# Patient Record
Sex: FEMALE | Race: WHITE | NOT HISPANIC OR LATINO | Employment: OTHER | URBAN - METROPOLITAN AREA
[De-identification: names, ages, dates, MRNs, and addresses within clinical notes are randomized per-mention and may not be internally consistent; named-entity substitution may affect disease eponyms.]

---

## 2016-10-26 LAB — CRC RECOMMENDATION EXT: NORMAL

## 2018-06-26 ENCOUNTER — HISTORICAL (OUTPATIENT)
Dept: ADMINISTRATIVE | Facility: HOSPITAL | Age: 63
End: 2018-06-26

## 2018-06-26 LAB
APPEARANCE, UA: CLEAR
BACTERIA #/AREA URNS AUTO: ABNORMAL /HPF
BILIRUB UR QL STRIP: NEGATIVE MG/DL
COLOR UR: YELLOW
GLUCOSE (UA): NEGATIVE MG/DL
HGB UR QL STRIP: ABNORMAL UNIT/L
KETONES UR QL STRIP: NEGATIVE MG/DL
LEUKOCYTE ESTERASE UR QL STRIP: ABNORMAL UNIT/L
NITRITE UR QL STRIP.AUTO: POSITIVE
PH UR STRIP: 7 [PH]
PROT UR QL STRIP: NEGATIVE MG/DL
RBC #/AREA URNS HPF: ABNORMAL /HPF
SP GR UR STRIP: 1.02
SQUAMOUS EPITHELIAL, UA: ABNORMAL /LPF
UROBILINOGEN UR STRIP-ACNC: 0.2 MG/DL
WBC #/AREA URNS AUTO: ABNORMAL /[HPF]

## 2018-10-16 ENCOUNTER — HISTORICAL (OUTPATIENT)
Dept: ADMINISTRATIVE | Facility: HOSPITAL | Age: 63
End: 2018-10-16

## 2019-01-30 ENCOUNTER — HISTORICAL (OUTPATIENT)
Dept: ADMINISTRATIVE | Facility: HOSPITAL | Age: 64
End: 2019-01-30

## 2019-01-30 LAB
ABS NEUT (OLG): 1.3 X10(3)/MCL (ref 2.1–9.2)
ALBUMIN SERPL-MCNC: 3.7 GM/DL (ref 3.4–5)
ALBUMIN/GLOB SERPL: 1.1 RATIO (ref 1.1–2)
ALP SERPL-CCNC: 62 UNIT/L (ref 38–126)
ALT SERPL-CCNC: 52 UNIT/L (ref 12–78)
ANISOCYTOSIS BLD QL SMEAR: 0
AST SERPL-CCNC: 36 UNIT/L (ref 15–37)
BILIRUB SERPL-MCNC: 0.5 MG/DL (ref 0.2–1)
BILIRUBIN DIRECT+TOT PNL SERPL-MCNC: 0.1 MG/DL (ref 0–0.5)
BILIRUBIN DIRECT+TOT PNL SERPL-MCNC: 0.4 MG/DL (ref 0–0.8)
BUN SERPL-MCNC: 19 MG/DL (ref 7–18)
CALCIUM SERPL-MCNC: 9.4 MG/DL (ref 8.5–10.1)
CHLORIDE SERPL-SCNC: 109 MMOL/L (ref 98–107)
CHOLEST SERPL-MCNC: 163 MG/DL (ref 0–200)
CHOLEST/HDLC SERPL: 2.6 {RATIO} (ref 0–4)
CO2 SERPL-SCNC: 27 MMOL/L (ref 21–32)
CREAT SERPL-MCNC: 1.21 MG/DL (ref 0.55–1.02)
EOSINOPHIL NFR BLD MANUAL: 3 % (ref 0–8)
ERYTHROCYTE [DISTWIDTH] IN BLOOD BY AUTOMATED COUNT: 11.9 % (ref 11.5–17)
GLOBULIN SER-MCNC: 3.3 GM/DL (ref 2.4–3.5)
GLUCOSE SERPL-MCNC: 91 MG/DL (ref 74–106)
HCT VFR BLD AUTO: 41.9 % (ref 37–47)
HDLC SERPL-MCNC: 63 MG/DL (ref 35–60)
HGB BLD-MCNC: 13.6 GM/DL (ref 12–16)
HYPOCHROMIA BLD QL SMEAR: 0
LDLC SERPL CALC-MCNC: 86 MG/DL (ref 0–129)
LYMPHOCYTES NFR BLD MANUAL: 37 % (ref 13–40)
MACROCYTES BLD QL SMEAR: 0
MCH RBC QN AUTO: 31.3 PG (ref 27–31)
MCHC RBC AUTO-ENTMCNC: 32.5 GM/DL (ref 33–36)
MCV RBC AUTO: 96.3 FL (ref 80–94)
MICROCYTES BLD QL SMEAR: 0
MONOCYTES NFR BLD MANUAL: 19 % (ref 2–11)
NEUTROPHILS NFR BLD MANUAL: 41 % (ref 47–80)
PLATELET # BLD AUTO: 185 X10(3)/MCL (ref 130–400)
PLATELET # BLD EST: NORMAL 10*3/UL
PMV BLD AUTO: 10.2 FL (ref 7.4–10.4)
POIKILOCYTOSIS BLD QL SMEAR: 0
POLYCHROMASIA BLD QL SMEAR: 0
POTASSIUM SERPL-SCNC: 4.5 MMOL/L (ref 3.5–5.1)
PROT SERPL-MCNC: 7 GM/DL (ref 6.4–8.2)
RBC # BLD AUTO: 4.35 X10(6)/MCL (ref 4.2–5.4)
SODIUM SERPL-SCNC: 144 MMOL/L (ref 136–145)
TRIGL SERPL-MCNC: 69 MG/DL (ref 30–150)
VLDLC SERPL CALC-MCNC: 14 MG/DL
WBC # SPEC AUTO: 2.9 X10(3)/MCL (ref 4.5–11.5)

## 2019-04-25 ENCOUNTER — HISTORICAL (OUTPATIENT)
Dept: ADMINISTRATIVE | Facility: HOSPITAL | Age: 64
End: 2019-04-25

## 2019-04-25 LAB
ABS NEUT (OLG): 2.68 X10(3)/MCL (ref 2.1–9.2)
APPEARANCE, UA: CLEAR
BACTERIA SPEC CULT: NORMAL /HPF
BASOPHILS # BLD AUTO: 0 X10(3)/MCL (ref 0–0.2)
BASOPHILS NFR BLD AUTO: 0 %
BILIRUB UR QL STRIP: NEGATIVE
BUN SERPL-MCNC: 21 MG/DL (ref 7–18)
CALCIUM SERPL-MCNC: 8.8 MG/DL (ref 8.5–10.1)
CHLORIDE SERPL-SCNC: 109 MMOL/L (ref 98–107)
CO2 SERPL-SCNC: 29 MMOL/L (ref 21–32)
COLOR UR: YELLOW
CREAT SERPL-MCNC: 1.04 MG/DL (ref 0.55–1.02)
CREAT/UREA NIT SERPL: 20.2
EOSINOPHIL # BLD AUTO: 0.1 X10(3)/MCL (ref 0–0.9)
EOSINOPHIL NFR BLD AUTO: 2 %
ERYTHROCYTE [DISTWIDTH] IN BLOOD BY AUTOMATED COUNT: 12.4 % (ref 11.5–17)
GLUCOSE (UA): NEGATIVE
GLUCOSE SERPL-MCNC: 91 MG/DL (ref 74–106)
HCT VFR BLD AUTO: 41.2 % (ref 37–47)
HGB BLD-MCNC: 13.3 GM/DL (ref 12–16)
HGB UR QL STRIP: NEGATIVE
KETONES UR QL STRIP: NEGATIVE
LEUKOCYTE ESTERASE UR QL STRIP: NEGATIVE
LYMPHOCYTES # BLD AUTO: 1.5 X10(3)/MCL (ref 0.6–4.6)
LYMPHOCYTES NFR BLD AUTO: 33 %
MCH RBC QN AUTO: 31.7 PG (ref 27–31)
MCHC RBC AUTO-ENTMCNC: 32.3 GM/DL (ref 33–36)
MCV RBC AUTO: 98.3 FL (ref 80–94)
MONOCYTES # BLD AUTO: 0.3 X10(3)/MCL (ref 0.1–1.3)
MONOCYTES NFR BLD AUTO: 6 %
NEUTROPHILS # BLD AUTO: 2.68 X10(3)/MCL (ref 2.1–9.2)
NEUTROPHILS NFR BLD AUTO: 58 %
NITRITE UR QL STRIP: NEGATIVE
PH UR STRIP: 5 [PH] (ref 5–9)
PLATELET # BLD AUTO: 199 X10(3)/MCL (ref 130–400)
PMV BLD AUTO: 10.3 FL (ref 9.4–12.4)
POTASSIUM SERPL-SCNC: 4.5 MMOL/L (ref 3.5–5.1)
PROT UR QL STRIP: NEGATIVE
RBC # BLD AUTO: 4.19 X10(6)/MCL (ref 4.2–5.4)
RBC #/AREA URNS HPF: NORMAL /[HPF]
SODIUM SERPL-SCNC: 142 MMOL/L (ref 136–145)
SP GR UR STRIP: 1.02 (ref 1–1.03)
SQUAMOUS EPITHELIAL, UA: NORMAL
UROBILINOGEN UR STRIP-ACNC: 0.2
WBC # SPEC AUTO: 4.6 X10(3)/MCL (ref 4.5–11.5)
WBC #/AREA URNS HPF: NORMAL /[HPF]

## 2019-06-27 LAB
BILIRUB SERPL-MCNC: NEGATIVE MG/DL
BLOOD URINE, POC: NEGATIVE
CLARITY, POC UA: CLEAR
COLOR, POC UA: YELLOW
GLUCOSE UR QL STRIP: NEGATIVE
KETONES UR QL STRIP: NEGATIVE
LEUKOCYTE EST, POC UA: NEGATIVE
NITRITE, POC UA: NEGATIVE
PH, POC UA: 5
PROTEIN, POC: NEGATIVE
SPECIFIC GRAVITY, POC UA: 1
UROBILINOGEN, POC UA: NORMAL

## 2020-01-22 ENCOUNTER — HISTORICAL (OUTPATIENT)
Dept: LAB | Facility: HOSPITAL | Age: 65
End: 2020-01-22

## 2020-01-22 LAB
ABS NEUT (OLG): 2.91 X10(3)/MCL (ref 2.1–9.2)
ALBUMIN SERPL-MCNC: 4.1 GM/DL (ref 3.4–5)
ALBUMIN/GLOB SERPL: 1.2 RATIO (ref 1.1–2)
ALP SERPL-CCNC: 62 UNIT/L (ref 38–126)
ALT SERPL-CCNC: 46 UNIT/L (ref 12–78)
APPEARANCE, UA: CLEAR
AST SERPL-CCNC: 22 UNIT/L (ref 15–37)
BACTERIA SPEC CULT: ABNORMAL /HPF
BASOPHILS # BLD AUTO: 0 X10(3)/MCL (ref 0–0.2)
BASOPHILS NFR BLD AUTO: 0 %
BILIRUB SERPL-MCNC: 0.6 MG/DL (ref 0.2–1)
BILIRUB UR QL STRIP: NEGATIVE
BILIRUBIN DIRECT+TOT PNL SERPL-MCNC: 0.1 MG/DL (ref 0–0.5)
BILIRUBIN DIRECT+TOT PNL SERPL-MCNC: 0.5 MG/DL (ref 0–0.8)
BUN SERPL-MCNC: 20 MG/DL (ref 7–18)
CALCIUM SERPL-MCNC: 9.6 MG/DL (ref 8.5–10.1)
CHLORIDE SERPL-SCNC: 111 MMOL/L (ref 98–107)
CHOLEST SERPL-MCNC: 185 MG/DL (ref 0–200)
CHOLEST/HDLC SERPL: 2.9 {RATIO} (ref 0–4)
CO2 SERPL-SCNC: 28 MMOL/L (ref 21–32)
COLOR UR: YELLOW
CREAT SERPL-MCNC: 1.09 MG/DL (ref 0.55–1.02)
EOSINOPHIL # BLD AUTO: 0.1 X10(3)/MCL (ref 0–0.9)
EOSINOPHIL NFR BLD AUTO: 3 %
ERYTHROCYTE [DISTWIDTH] IN BLOOD BY AUTOMATED COUNT: 12.2 % (ref 11.5–17)
GLOBULIN SER-MCNC: 3.3 GM/DL (ref 2.4–3.5)
GLUCOSE (UA): NEGATIVE
GLUCOSE SERPL-MCNC: 96 MG/DL (ref 74–106)
HCT VFR BLD AUTO: 43.4 % (ref 37–47)
HDLC SERPL-MCNC: 63 MG/DL (ref 35–60)
HGB BLD-MCNC: 14.2 GM/DL (ref 12–16)
HGB UR QL STRIP: NEGATIVE
KETONES UR QL STRIP: NEGATIVE
LDLC SERPL CALC-MCNC: 104 MG/DL (ref 0–129)
LEUKOCYTE ESTERASE UR QL STRIP: ABNORMAL
LYMPHOCYTES # BLD AUTO: 1.5 X10(3)/MCL (ref 0.6–4.6)
LYMPHOCYTES NFR BLD AUTO: 31 %
MCH RBC QN AUTO: 31.6 PG (ref 27–31)
MCHC RBC AUTO-ENTMCNC: 32.7 GM/DL (ref 33–36)
MCV RBC AUTO: 96.4 FL (ref 80–94)
MONOCYTES # BLD AUTO: 0.3 X10(3)/MCL (ref 0.1–1.3)
MONOCYTES NFR BLD AUTO: 6 %
NEUTROPHILS # BLD AUTO: 2.91 X10(3)/MCL (ref 2.1–9.2)
NEUTROPHILS NFR BLD AUTO: 60 %
NITRITE UR QL STRIP: NEGATIVE
PH UR STRIP: 5.5 [PH] (ref 5–9)
PLATELET # BLD AUTO: 255 X10(3)/MCL (ref 130–400)
PMV BLD AUTO: 10.8 FL (ref 9.4–12.4)
POTASSIUM SERPL-SCNC: 4.6 MMOL/L (ref 3.5–5.1)
PROT SERPL-MCNC: 7.4 GM/DL (ref 6.4–8.2)
PROT UR QL STRIP: NEGATIVE
RBC # BLD AUTO: 4.5 X10(6)/MCL (ref 4.2–5.4)
RBC #/AREA URNS HPF: ABNORMAL /[HPF]
SODIUM SERPL-SCNC: 145 MMOL/L (ref 136–145)
SP GR UR STRIP: 1.02 (ref 1–1.03)
SQUAMOUS EPITHELIAL, UA: ABNORMAL
TRIGL SERPL-MCNC: 88 MG/DL (ref 30–150)
UROBILINOGEN UR STRIP-ACNC: 0.2
VLDLC SERPL CALC-MCNC: 18 MG/DL
WBC # SPEC AUTO: 4.9 X10(3)/MCL (ref 4.5–11.5)
WBC #/AREA URNS HPF: ABNORMAL /[HPF]

## 2021-01-26 ENCOUNTER — HISTORICAL (OUTPATIENT)
Dept: ADMINISTRATIVE | Facility: HOSPITAL | Age: 66
End: 2021-01-26

## 2021-01-26 LAB
ABS NEUT (OLG): 3.47 X10(3)/MCL (ref 2.1–9.2)
ALBUMIN SERPL-MCNC: 4.2 GM/DL (ref 3.4–4.8)
ALBUMIN/GLOB SERPL: 1.7 RATIO (ref 1.1–2)
ALP SERPL-CCNC: 61 UNIT/L (ref 40–150)
ALT SERPL-CCNC: 29 UNIT/L (ref 0–55)
AST SERPL-CCNC: 22 UNIT/L (ref 5–34)
BASOPHILS # BLD AUTO: 0 X10(3)/MCL (ref 0–0.2)
BASOPHILS NFR BLD AUTO: 0 %
BILIRUB SERPL-MCNC: 0.6 MG/DL
BILIRUBIN DIRECT+TOT PNL SERPL-MCNC: 0.2 MG/DL (ref 0–0.5)
BILIRUBIN DIRECT+TOT PNL SERPL-MCNC: 0.4 MG/DL (ref 0–0.8)
BUN SERPL-MCNC: 18.9 MG/DL (ref 9.8–20.1)
CALCIUM SERPL-MCNC: 9.3 MG/DL (ref 8.4–10.2)
CHLORIDE SERPL-SCNC: 107 MMOL/L (ref 98–107)
CHOLEST SERPL-MCNC: 191 MG/DL
CHOLEST/HDLC SERPL: 4 {RATIO} (ref 0–5)
CO2 SERPL-SCNC: 25 MMOL/L (ref 23–31)
CREAT SERPL-MCNC: 0.99 MG/DL (ref 0.55–1.02)
EOSINOPHIL # BLD AUTO: 0.1 X10(3)/MCL (ref 0–0.9)
EOSINOPHIL NFR BLD AUTO: 2 %
ERYTHROCYTE [DISTWIDTH] IN BLOOD BY AUTOMATED COUNT: 12.4 % (ref 11.5–17)
GLOBULIN SER-MCNC: 2.5 GM/DL (ref 2.4–3.5)
GLUCOSE SERPL-MCNC: 87 MG/DL (ref 82–115)
HCT VFR BLD AUTO: 41.8 % (ref 37–47)
HDLC SERPL-MCNC: 47 MG/DL (ref 35–60)
HGB BLD-MCNC: 13.8 GM/DL (ref 12–16)
LDLC SERPL CALC-MCNC: 119 MG/DL (ref 50–140)
LYMPHOCYTES # BLD AUTO: 1.9 X10(3)/MCL (ref 0.6–4.6)
LYMPHOCYTES NFR BLD AUTO: 32 %
MCH RBC QN AUTO: 31.9 PG (ref 27–31)
MCHC RBC AUTO-ENTMCNC: 33 GM/DL (ref 33–36)
MCV RBC AUTO: 96.5 FL (ref 80–94)
MONOCYTES # BLD AUTO: 0.4 X10(3)/MCL (ref 0.1–1.3)
MONOCYTES NFR BLD AUTO: 6 %
NEUTROPHILS # BLD AUTO: 3.47 X10(3)/MCL (ref 2.1–9.2)
NEUTROPHILS NFR BLD AUTO: 59 %
PLATELET # BLD AUTO: 246 X10(3)/MCL (ref 130–400)
PMV BLD AUTO: 11.1 FL (ref 9.4–12.4)
POTASSIUM SERPL-SCNC: 4.9 MMOL/L (ref 3.5–5.1)
PROT SERPL-MCNC: 6.7 GM/DL (ref 5.8–7.6)
RBC # BLD AUTO: 4.33 X10(6)/MCL (ref 4.2–5.4)
SODIUM SERPL-SCNC: 144 MMOL/L (ref 136–145)
TRIGL SERPL-MCNC: 127 MG/DL (ref 37–140)
VLDLC SERPL CALC-MCNC: 25 MG/DL
WBC # SPEC AUTO: 5.9 X10(3)/MCL (ref 4.5–11.5)

## 2021-03-21 ENCOUNTER — HISTORICAL (OUTPATIENT)
Dept: ADMINISTRATIVE | Facility: HOSPITAL | Age: 66
End: 2021-03-21

## 2021-03-31 ENCOUNTER — HISTORICAL (OUTPATIENT)
Dept: ADMINISTRATIVE | Facility: HOSPITAL | Age: 66
End: 2021-03-31

## 2021-04-14 ENCOUNTER — HISTORICAL (OUTPATIENT)
Dept: ADMINISTRATIVE | Facility: HOSPITAL | Age: 66
End: 2021-04-14

## 2021-05-05 ENCOUNTER — HISTORICAL (OUTPATIENT)
Dept: ADMINISTRATIVE | Facility: HOSPITAL | Age: 66
End: 2021-05-05

## 2021-05-28 ENCOUNTER — HISTORICAL (OUTPATIENT)
Dept: ADMINISTRATIVE | Facility: HOSPITAL | Age: 66
End: 2021-05-28

## 2021-06-16 ENCOUNTER — HISTORICAL (OUTPATIENT)
Dept: ADMINISTRATIVE | Facility: HOSPITAL | Age: 66
End: 2021-06-16

## 2021-08-02 ENCOUNTER — HISTORICAL (OUTPATIENT)
Dept: ADMINISTRATIVE | Facility: HOSPITAL | Age: 66
End: 2021-08-02

## 2022-02-15 ENCOUNTER — HISTORICAL (OUTPATIENT)
Dept: ADMINISTRATIVE | Facility: HOSPITAL | Age: 67
End: 2022-02-15

## 2022-02-15 LAB
ABS NEUT (OLG): 2.37 (ref 2.1–9.2)
ALBUMIN SERPL-MCNC: 3.9 G/DL (ref 3.4–4.8)
ALBUMIN/GLOB SERPL: 1.3 {RATIO} (ref 1.1–2)
ALP SERPL-CCNC: 60 U/L (ref 40–150)
ALT SERPL-CCNC: 29 U/L (ref 0–55)
AST SERPL-CCNC: 25 U/L (ref 5–34)
BASOPHILS # BLD AUTO: 0 10*3/UL (ref 0–0.2)
BASOPHILS NFR BLD AUTO: 0 %
BILIRUB SERPL-MCNC: 0.7 MG/DL
BILIRUBIN DIRECT+TOT PNL SERPL-MCNC: 0.3 (ref 0–0.5)
BILIRUBIN DIRECT+TOT PNL SERPL-MCNC: 0.4 (ref 0–0.8)
BUN SERPL-MCNC: 14.9 MG/DL (ref 9.8–20.1)
CALCIUM SERPL-MCNC: 9.6 MG/DL (ref 8.7–10.5)
CHLORIDE SERPL-SCNC: 109 MMOL/L (ref 98–107)
CHOLEST SERPL-MCNC: 210 MG/DL
CHOLEST/HDLC SERPL: 4 {RATIO} (ref 0–5)
CO2 SERPL-SCNC: 25 MMOL/L (ref 23–31)
CREAT SERPL-MCNC: 0.89 MG/DL (ref 0.55–1.02)
EOSINOPHIL # BLD AUTO: 0.1 10*3/UL (ref 0–0.9)
EOSINOPHIL NFR BLD AUTO: 2 %
ERYTHROCYTE [DISTWIDTH] IN BLOOD BY AUTOMATED COUNT: 11.9 % (ref 11.5–17)
GLOBULIN SER-MCNC: 3 G/DL (ref 2.4–3.5)
GLUCOSE SERPL-MCNC: 91 MG/DL (ref 82–115)
HCT VFR BLD AUTO: 42 % (ref 37–47)
HDLC SERPL-MCNC: 58 MG/DL (ref 35–60)
HEMOLYSIS INTERF INDEX SERPL-ACNC: 18
HGB BLD-MCNC: 14.2 G/DL (ref 12–16)
ICTERIC INTERF INDEX SERPL-ACNC: 1
LDLC SERPL CALC-MCNC: 129 MG/DL (ref 50–140)
LIPEMIC INTERF INDEX SERPL-ACNC: 2
LYMPHOCYTES # BLD AUTO: 1.9 10*3/UL (ref 0.6–4.6)
LYMPHOCYTES NFR BLD AUTO: 40 %
MANUAL DIFF? (OHS): NO
MCH RBC QN AUTO: 31.8 PG (ref 27–31)
MCHC RBC AUTO-ENTMCNC: 33.8 G/DL (ref 33–36)
MCV RBC AUTO: 94 FL (ref 80–94)
MONOCYTES # BLD AUTO: 0.3 10*3/UL (ref 0.1–1.3)
MONOCYTES NFR BLD AUTO: 6 %
NEUTROPHILS # BLD AUTO: 2.37 10*3/UL (ref 2.1–9.2)
NEUTROPHILS NFR BLD AUTO: 51 %
PLATELET # BLD AUTO: 234 10*3/UL (ref 130–400)
PMV BLD AUTO: 10.8 FL (ref 9.4–12.4)
POTASSIUM SERPL-SCNC: 4.3 MMOL/L (ref 3.5–5.1)
PROT SERPL-MCNC: 6.9 G/DL (ref 5.8–7.6)
RBC # BLD AUTO: 4.47 10*6/UL (ref 4.2–5.4)
SODIUM SERPL-SCNC: 145 MMOL/L (ref 136–145)
TRIGL SERPL-MCNC: 113 MG/DL (ref 37–140)
VLDLC SERPL CALC-MCNC: 23 MG/DL
WBC # SPEC AUTO: 4.7 10*3/UL (ref 4.5–11.5)

## 2022-09-22 ENCOUNTER — HISTORICAL (OUTPATIENT)
Dept: ADMINISTRATIVE | Facility: HOSPITAL | Age: 67
End: 2022-09-22
Payer: COMMERCIAL

## 2022-12-14 ENCOUNTER — DOCUMENTATION ONLY (OUTPATIENT)
Dept: PRIMARY CARE CLINIC | Facility: CLINIC | Age: 67
End: 2022-12-14
Payer: COMMERCIAL

## 2023-01-24 ENCOUNTER — OFFICE VISIT (OUTPATIENT)
Dept: URGENT CARE | Facility: CLINIC | Age: 68
End: 2023-01-24
Payer: COMMERCIAL

## 2023-01-24 VITALS
RESPIRATION RATE: 20 BRPM | OXYGEN SATURATION: 98 % | HEART RATE: 61 BPM | BODY MASS INDEX: 23.95 KG/M2 | SYSTOLIC BLOOD PRESSURE: 129 MMHG | WEIGHT: 149 LBS | DIASTOLIC BLOOD PRESSURE: 87 MMHG | TEMPERATURE: 98 F | HEIGHT: 66 IN

## 2023-01-24 DIAGNOSIS — R09.81 CONGESTION OF NASAL SINUS: ICD-10-CM

## 2023-01-24 DIAGNOSIS — J01.90 ACUTE RHINOSINUSITIS: Primary | ICD-10-CM

## 2023-01-24 DIAGNOSIS — H61.22 IMPACTED CERUMEN OF LEFT EAR: ICD-10-CM

## 2023-01-24 LAB
CTP QC/QA: YES
CTP QC/QA: YES
POC MOLECULAR INFLUENZA A AGN: NEGATIVE
POC MOLECULAR INFLUENZA B AGN: NEGATIVE
SARS-COV-2 RDRP RESP QL NAA+PROBE: NEGATIVE

## 2023-01-24 PROCEDURE — 87635: ICD-10-PCS | Mod: QW,,, | Performed by: FAMILY MEDICINE

## 2023-01-24 PROCEDURE — 3008F PR BODY MASS INDEX (BMI) DOCUMENTED: ICD-10-PCS | Mod: CPTII,,, | Performed by: FAMILY MEDICINE

## 2023-01-24 PROCEDURE — 1100F PTFALLS ASSESS-DOCD GE2>/YR: CPT | Mod: CPTII,,, | Performed by: FAMILY MEDICINE

## 2023-01-24 PROCEDURE — 87502 POCT INFLUENZA A/B MOLECULAR: ICD-10-PCS | Mod: QW,,, | Performed by: FAMILY MEDICINE

## 2023-01-24 PROCEDURE — 99213 OFFICE O/P EST LOW 20 MIN: CPT | Mod: 25,,, | Performed by: FAMILY MEDICINE

## 2023-01-24 PROCEDURE — 1159F PR MEDICATION LIST DOCUMENTED IN MEDICAL RECORD: ICD-10-PCS | Mod: CPTII,,, | Performed by: FAMILY MEDICINE

## 2023-01-24 PROCEDURE — 1160F PR REVIEW ALL MEDS BY PRESCRIBER/CLIN PHARMACIST DOCUMENTED: ICD-10-PCS | Mod: CPTII,,, | Performed by: FAMILY MEDICINE

## 2023-01-24 PROCEDURE — 3079F DIAST BP 80-89 MM HG: CPT | Mod: CPTII,,, | Performed by: FAMILY MEDICINE

## 2023-01-24 PROCEDURE — 1159F MED LIST DOCD IN RCRD: CPT | Mod: CPTII,,, | Performed by: FAMILY MEDICINE

## 2023-01-24 PROCEDURE — 3288F PR FALLS RISK ASSESSMENT DOCUMENTED: ICD-10-PCS | Mod: CPTII,,, | Performed by: FAMILY MEDICINE

## 2023-01-24 PROCEDURE — 3288F FALL RISK ASSESSMENT DOCD: CPT | Mod: CPTII,,, | Performed by: FAMILY MEDICINE

## 2023-01-24 PROCEDURE — 3008F BODY MASS INDEX DOCD: CPT | Mod: CPTII,,, | Performed by: FAMILY MEDICINE

## 2023-01-24 PROCEDURE — 3074F SYST BP LT 130 MM HG: CPT | Mod: CPTII,,, | Performed by: FAMILY MEDICINE

## 2023-01-24 PROCEDURE — 3074F PR MOST RECENT SYSTOLIC BLOOD PRESSURE < 130 MM HG: ICD-10-PCS | Mod: CPTII,,, | Performed by: FAMILY MEDICINE

## 2023-01-24 PROCEDURE — 1100F PR PT FALLS ASSESS DOC 2+ FALLS/FALL W/INJURY/YR: ICD-10-PCS | Mod: CPTII,,, | Performed by: FAMILY MEDICINE

## 2023-01-24 PROCEDURE — 3079F PR MOST RECENT DIASTOLIC BLOOD PRESSURE 80-89 MM HG: ICD-10-PCS | Mod: CPTII,,, | Performed by: FAMILY MEDICINE

## 2023-01-24 PROCEDURE — 99213 PR OFFICE/OUTPT VISIT, EST, LEVL III, 20-29 MIN: ICD-10-PCS | Mod: 25,,, | Performed by: FAMILY MEDICINE

## 2023-01-24 PROCEDURE — 1160F RVW MEDS BY RX/DR IN RCRD: CPT | Mod: CPTII,,, | Performed by: FAMILY MEDICINE

## 2023-01-24 PROCEDURE — 87502 INFLUENZA DNA AMP PROBE: CPT | Mod: QW,,, | Performed by: FAMILY MEDICINE

## 2023-01-24 PROCEDURE — 87635 SARS-COV-2 COVID-19 AMP PRB: CPT | Mod: QW,,, | Performed by: FAMILY MEDICINE

## 2023-01-24 PROCEDURE — 96372 THER/PROPH/DIAG INJ SC/IM: CPT | Mod: ,,, | Performed by: FAMILY MEDICINE

## 2023-01-24 PROCEDURE — 96372 PR INJECTION,THERAP/PROPH/DIAG2ST, IM OR SUBCUT: ICD-10-PCS | Mod: ,,, | Performed by: FAMILY MEDICINE

## 2023-01-24 RX ORDER — BETAMETHASONE SODIUM PHOSPHATE AND BETAMETHASONE ACETATE 3; 3 MG/ML; MG/ML
6 INJECTION, SUSPENSION INTRA-ARTICULAR; INTRALESIONAL; INTRAMUSCULAR; SOFT TISSUE
Status: COMPLETED | OUTPATIENT
Start: 2023-01-24 | End: 2023-01-24

## 2023-01-24 RX ADMIN — BETAMETHASONE SODIUM PHOSPHATE AND BETAMETHASONE ACETATE 6 MG: 3; 3 INJECTION, SUSPENSION INTRA-ARTICULAR; INTRALESIONAL; INTRAMUSCULAR; SOFT TISSUE at 09:01

## 2023-01-24 NOTE — PATIENT INSTRUCTIONS
Discussed the physical finding, condition and course.  [facility] IM today risk and benefits discussed voiced understanding.  Continue Claritin and Flonase for now.  Adequate hydration.  Call or return to clinic for any questions.  Avoid driving until dizziness improved.  With improving symptoms will monitor for now  ER precautions with any acute change in symptoms.  Flu test negative, COVID-19 test negative  Discussed in detail on all cerumen impaction.  Defers irrigation today.  With persistent symptoms can return to clinic.  Voiced understanding

## 2023-01-24 NOTE — PROGRESS NOTES
"Subjective:       Patient ID: Leah Pepper is a 67 y.o. female.    Vitals:  height is 5' 6" (1.676 m) and weight is 67.6 kg (149 lb). Her oral temperature is 98.1 °F (36.7 °C). Her blood pressure is 129/87 and her pulse is 61. Her respiration is 20 and oxygen saturation is 98%.     Chief Complaint: Sinus Problem (Sinus congestion, dizzy, nausea, x 3 days)    HPI:  67-year-old female present to clinic with concerns of nasal congestion, sinus congestion, feeling nauseated since 3 days.  No measured fever at home.  Denies eating anything different or unusual.  No concerns of positive exposure to infections.  Complains of sinus pressure and headache.  Dizziness on and off since 4 days.  No aggravating or alleviating factors.  Felt nauseated on Saturday with dizziness.  Reviewed the vital signs appears stable.    ROS  :  Constitutional : _ no fever, no body aches or headache  Eyes : _No redness, drainage or pain  HENT_sore throat, postnasal drainage  Respiratory_no wheezing, no shortness of breath  Cardiovascular_no chest pain  Gastrointestinal_ positive for nausea, denies abdominal pain vomiting or diarrhea  Musculoskeletal_no joint pain, no joint swelling  Integumentary_no skin rash     Objective:      Physical Exam    General : Alert and Oriented, No apparent distress, afebrile  Neck - supple  HENT : Oropharynx no redness or swelling.  Left ear canal cerumen impaction, right ear canal excessive cerumen  Respiratory : Bilateral equal breath sounds, nonlabored respirations  Cardiovascular : Rate, rhythm regular, normal volume pulse, no murmur  Integumentary : Warm, Dry and no rash    Assessment:       1. Acute rhinosinusitis    2. Congestion of nasal sinus    3. Impacted cerumen of left ear       Plan:     Discussed the physical finding, condition and course.  [facility] IM today risk and benefits discussed voiced understanding.  Continue Claritin and Flonase for now.  Adequate hydration.  Call or return to clinic for any " questions.  Avoid driving until dizziness improved.  With improving symptoms will monitor for now  ER precautions with any acute change in symptoms.  Flu test negative, COVID-19 test negative  Discussed in detail on all cerumen impaction.  Defers irrigation today.  With persistent symptoms can return to clinic.  Voiced understanding    Acute rhinosinusitis  -     betamethasone acetate-betamethasone sodium phosphate injection 6 mg    Congestion of nasal sinus  -     POCT COVID-19 Rapid Screening  -     POCT Influenza A/B Molecular    Impacted cerumen of left ear

## 2023-07-19 PROBLEM — Z00.00 WELLNESS EXAMINATION: Status: ACTIVE | Noted: 2023-07-19

## 2023-07-19 NOTE — PROGRESS NOTES
Date: 07/20/2023  Patient ID: [id number]   Chief Complaint: Medicare AWV (Without labs)    HPI:   Leah Pepper is a 68 y.o. female here today for a Medicare Wellness.     Opioid Screening: Patient medication list reviewed, patient is not taking prescription opioids. Patient is not using additional opioids than prescribed. Patient is at low risk of substance abuse based on this opioid use history.     Otherwise, patient reports to be doing well. Does have hearing loss and couldn't get right fitted hearing aids    Diet: good, healthy  Activity level: does a lot of housework    Patient Active Problem List   Diagnosis    Wellness examination     No outpatient medications have been marked as taking for the 7/20/23 encounter (Office Visit) with Janeth Tian MD.     Past Medical History:   Diagnosis Date    No known health problems      Past Surgical History:   Procedure Laterality Date    COSMETIC SURGERY       Review of patient's allergies indicates:   Allergen Reactions    Diphenhydramine      Other reaction(s): heart races     Family History   Problem Relation Age of Onset    Heart disease Father     Diabetes Father     No Known Problems Sister     No Known Problems Brother       Social History     Socioeconomic History    Marital status:    Tobacco Use    Smoking status: Former     Types: Cigarettes    Smokeless tobacco: Never   Substance and Sexual Activity    Alcohol use: Yes     Comment: ocassional    Drug use: Never    Sexual activity: Yes     Patient Care Team:  Janeth Tian MD as PCP - General (Family Medicine)   Subjective:   Review of Systems   Constitutional: Negative.  Negative for fever and weight loss.   HENT:  Negative for congestion, hearing loss and sore throat.    Eyes:  Negative for blurred vision.   Respiratory:  Negative for cough and shortness of breath.    Cardiovascular:  Negative for chest pain, palpitations and leg swelling.   Gastrointestinal:  Negative for abdominal pain, blood in  stool, constipation, diarrhea, nausea and vomiting.   Genitourinary:  Negative for dysuria, frequency, hematuria and urgency.   Musculoskeletal:  Negative for joint pain.   Skin:  Negative for rash.   Neurological:  Negative for weakness and headaches.   Psychiatric/Behavioral:  Negative for depression. The patient is not nervous/anxious and does not have insomnia.    See HPI for details  All Other ROS: Negative except as stated in HPI  Patient Reported Health Risk Assessment  [age]  Are you male or female?: Female  During the past four weeks, how much have you been bothered by emotional problems such as feeling anxious, depressed, irritable, sad, or downhearted and blue?: Not at all  During the past five weeks, has your physical and/or emotional health limited your social activities with family, friends, neighbors, or groups?: Not at all  During the past four weeks, how much bodily pain have you generally had?: No pain  During the past four weeks, was someone available to help if you needed and wanted help?: Yes, as much as I wanted  During the past four weeks, what was the hardest physical activity you could do for at least two minutes?: Heavy  Can you get to places out of walking distance without help?  (For example, can you travel alone on buses or taxis, or drive your own car?): Yes  Can you go shopping for groceries or clothes without someone's help?: Yes  Can you prepare your own meals?: Yes  Can you do your own housework without help?: Yes  Because of any health problems, do you need the help of another person with your personal care needs such as eating, bathing, dressing, or getting around the house?: No  Can you handle your own money without help?: Yes  During the past four weeks, how would you rate your health in general?: Excellent  How have things been going for you during the past four weeks?: Pretty well  Are you having difficulties driving your car?: No  Do you always fasten your  seat belt when you are in a car?: Yes, usually  How often in the past four weeks have you been bothered by falling or dizzy when standing up?: Never  How often in the past four weeks have you been bothered by sexual problems?: Never  How often in the past four weeks have you been bothered by trouble eating well?: Never  How often in the past four weeks have you been bothered by teeth or denture problems?: Never  How often in the past four weeks have you been bothered with problems using the telephone?: Never  How often in the past four weeks have you been bothered by tiredness or fatigue?: Never  Have you fallen two or more times in the past year?: No  Are you afraid of falling?: No  Are you a smoker?: No  During the past four weeks, how many drinks of wine, beer, or other alcoholic beverages did you have?: No alcohol at all  Do you exercise for about 20 minutes three or more days a week?: No, I usually do not exercise this much  Have you been given any information to help you with hazards in your house that might hurt you?: Yes  Have you been given any information to help you with keeping track of your medications?: Yes  How often do you have trouble taking medicines the way you've been told to take them?: I always take them as prescribed  How confident are you that you can control and manage most of your health problems?: Very confident  What is your race? (Check all that apply.):   Objective:   /67   Pulse 76   Wt 70.7 kg (155 lb 14.4 oz)   SpO2 96%   BMI 25.16 kg/m²   Physical Exam  Vitals reviewed.   Constitutional:       Appearance: Normal appearance.   HENT:      Head: Normocephalic and atraumatic.      Right Ear: Tympanic membrane, ear canal and external ear normal.      Left Ear: Tympanic membrane, ear canal and external ear normal.      Nose: Nose normal. No congestion or rhinorrhea.      Mouth/Throat:      Mouth: Mucous membranes are moist.      Pharynx: Oropharynx is clear.   Eyes:       General: No scleral icterus.     Extraocular Movements: Extraocular movements intact.      Conjunctiva/sclera: Conjunctivae normal.   Cardiovascular:      Rate and Rhythm: Normal rate and regular rhythm.      Pulses: Normal pulses.      Heart sounds: Normal heart sounds.   Pulmonary:      Effort: Pulmonary effort is normal.      Breath sounds: Normal breath sounds.   Abdominal:      General: Abdomen is flat. Bowel sounds are normal.      Palpations: Abdomen is soft.      Tenderness: There is no abdominal tenderness.   Musculoskeletal:         General: No swelling or deformity. Normal range of motion.      Cervical back: Normal range of motion and neck supple.   Skin:     General: Skin is warm and dry.   Neurological:      Mental Status: She is alert and oriented to person, place, and time.   Psychiatric:         Mood and Affect: Mood normal.         Behavior: Behavior normal.         Thought Content: Thought content normal.         Judgment: Judgment normal.     No flowsheet data found.  Fall Risk Assessment - Outpatient 7/20/2023 1/24/2023   Mobility Status Ambulatory Ambulatory   Number of falls 0 1 with injury   Identified as fall risk 0 1   Wrist band refused - 1           Depression Screening  Over the past two weeks, has the patient felt down, depressed, or hopeless?: No  Over the past two weeks, has the patient felt little interest or pleasure in doing things?: No  Functional Ability/Safety Screening  Was the patient's timed Up & Go test unsteady or longer than 30 seconds?: No  Does the patient need help with phone, transportation, shopping, preparing meals, housework, laundry, meds, or managing money?: No  Does the patient's home have rugs in the hallway, lack grab bars in the bathroom, lack handrails on the stairs or have poor lighting?: No  Have you noticed any hearing difficulties?: No  Cognitive Function (Assessed through direct observation with due consideration of information obtained by way of  patient reports and/or concerns raised by family, friends, caretakers, or others)    Does the patient repeat questions/statements in the same day?: No  Does the patient have trouble remembering the date, year, and time?: No  Does the patient have difficulty managing finances?: No  Does the patient have a decreased sense of direction?: No  Assessment:       ICD-10-CM ICD-9-CM   1. Wellness examination  Z00.00 V70.0   2. Breast cancer screening by mammogram  Z12.31 V76.12   3. Post-menopausal  Z78.0 V49.81      Plan:   1. Wellness examination  Overview:  Routine labs and preventative care discussed.  Continue healthy lifestyle modifications        2. Breast cancer screening by mammogram  -     Mammo Digital Screening Bilat w/ Jonatan; Future; Expected date: 07/20/2023    3. Post-menopausal  -     DXA Bone Density Axial Skeleton 1 or more sites; Future; Expected date: 07/20/2023         Medicare Annual Wellness and Personalized Prevention Plan:   Fall Risk + Home Safety + Hearing Impairment + Depression Screen + Opioid and Substance Abuse Screening + Cognitive Impairment Screen + Health Risk Assessment all reviewed.     Health Maintenance Topics with due status: Not Due       Topic Last Completion Date    Colorectal Cancer Screening 10/26/2016    Lipid Panel 02/15/2022    Influenza Vaccine Not Due      The patient's Health Maintenance was reviewed and the following appears to be due at this time:   Health Maintenance Due   Topic Date Due    Hepatitis C Screening  Never done    TETANUS VACCINE  Never done    Mammogram  Never done    Hemoglobin A1c (Diabetic Prevention Screening)  Never done    DEXA Scan  Never done    Pneumococcal Vaccines (Age 65+) (2 - PPSV23 if available, else PCV20) 01/29/2022    COVID-19 Vaccine (5 - Pfizer series) 07/07/2022       Advance Care Planning     Date: 07/19/2023  Has one in place       Follow up in about 1 year (around 7/20/2024) for Medicare Wellness. In addition to their scheduled follow  up, the patient has also been instructed to follow up on as needed basis.

## 2023-07-20 ENCOUNTER — OFFICE VISIT (OUTPATIENT)
Dept: PRIMARY CARE CLINIC | Facility: CLINIC | Age: 68
End: 2023-07-20
Payer: COMMERCIAL

## 2023-07-20 VITALS
OXYGEN SATURATION: 96 % | WEIGHT: 155.88 LBS | BODY MASS INDEX: 25.16 KG/M2 | SYSTOLIC BLOOD PRESSURE: 105 MMHG | DIASTOLIC BLOOD PRESSURE: 67 MMHG | HEART RATE: 76 BPM

## 2023-07-20 DIAGNOSIS — Z00.00 WELLNESS EXAMINATION: Primary | ICD-10-CM

## 2023-07-20 DIAGNOSIS — Z12.31 BREAST CANCER SCREENING BY MAMMOGRAM: ICD-10-CM

## 2023-07-20 DIAGNOSIS — Z78.0 POST-MENOPAUSAL: ICD-10-CM

## 2023-07-20 PROCEDURE — 3008F PR BODY MASS INDEX (BMI) DOCUMENTED: ICD-10-PCS | Mod: CPTII,,, | Performed by: STUDENT IN AN ORGANIZED HEALTH CARE EDUCATION/TRAINING PROGRAM

## 2023-07-20 PROCEDURE — 3288F PR FALLS RISK ASSESSMENT DOCUMENTED: ICD-10-PCS | Mod: CPTII,,, | Performed by: STUDENT IN AN ORGANIZED HEALTH CARE EDUCATION/TRAINING PROGRAM

## 2023-07-20 PROCEDURE — G0439 PPPS, SUBSEQ VISIT: HCPCS | Mod: ,,, | Performed by: STUDENT IN AN ORGANIZED HEALTH CARE EDUCATION/TRAINING PROGRAM

## 2023-07-20 PROCEDURE — 1159F MED LIST DOCD IN RCRD: CPT | Mod: CPTII,,, | Performed by: STUDENT IN AN ORGANIZED HEALTH CARE EDUCATION/TRAINING PROGRAM

## 2023-07-20 PROCEDURE — 3074F PR MOST RECENT SYSTOLIC BLOOD PRESSURE < 130 MM HG: ICD-10-PCS | Mod: CPTII,,, | Performed by: STUDENT IN AN ORGANIZED HEALTH CARE EDUCATION/TRAINING PROGRAM

## 2023-07-20 PROCEDURE — 1160F RVW MEDS BY RX/DR IN RCRD: CPT | Mod: CPTII,,, | Performed by: STUDENT IN AN ORGANIZED HEALTH CARE EDUCATION/TRAINING PROGRAM

## 2023-07-20 PROCEDURE — 3008F BODY MASS INDEX DOCD: CPT | Mod: CPTII,,, | Performed by: STUDENT IN AN ORGANIZED HEALTH CARE EDUCATION/TRAINING PROGRAM

## 2023-07-20 PROCEDURE — G0439 PR MEDICARE ANNUAL WELLNESS SUBSEQUENT VISIT: ICD-10-PCS | Mod: ,,, | Performed by: STUDENT IN AN ORGANIZED HEALTH CARE EDUCATION/TRAINING PROGRAM

## 2023-07-20 PROCEDURE — 1101F PR PT FALLS ASSESS DOC 0-1 FALLS W/OUT INJ PAST YR: ICD-10-PCS | Mod: CPTII,,, | Performed by: STUDENT IN AN ORGANIZED HEALTH CARE EDUCATION/TRAINING PROGRAM

## 2023-07-20 PROCEDURE — 3078F PR MOST RECENT DIASTOLIC BLOOD PRESSURE < 80 MM HG: ICD-10-PCS | Mod: CPTII,,, | Performed by: STUDENT IN AN ORGANIZED HEALTH CARE EDUCATION/TRAINING PROGRAM

## 2023-07-20 PROCEDURE — 3288F FALL RISK ASSESSMENT DOCD: CPT | Mod: CPTII,,, | Performed by: STUDENT IN AN ORGANIZED HEALTH CARE EDUCATION/TRAINING PROGRAM

## 2023-07-20 PROCEDURE — 1126F AMNT PAIN NOTED NONE PRSNT: CPT | Mod: CPTII,,, | Performed by: STUDENT IN AN ORGANIZED HEALTH CARE EDUCATION/TRAINING PROGRAM

## 2023-07-20 PROCEDURE — 3074F SYST BP LT 130 MM HG: CPT | Mod: CPTII,,, | Performed by: STUDENT IN AN ORGANIZED HEALTH CARE EDUCATION/TRAINING PROGRAM

## 2023-07-20 PROCEDURE — 3078F DIAST BP <80 MM HG: CPT | Mod: CPTII,,, | Performed by: STUDENT IN AN ORGANIZED HEALTH CARE EDUCATION/TRAINING PROGRAM

## 2023-07-20 PROCEDURE — 1101F PT FALLS ASSESS-DOCD LE1/YR: CPT | Mod: CPTII,,, | Performed by: STUDENT IN AN ORGANIZED HEALTH CARE EDUCATION/TRAINING PROGRAM

## 2023-07-20 PROCEDURE — 1159F PR MEDICATION LIST DOCUMENTED IN MEDICAL RECORD: ICD-10-PCS | Mod: CPTII,,, | Performed by: STUDENT IN AN ORGANIZED HEALTH CARE EDUCATION/TRAINING PROGRAM

## 2023-07-20 PROCEDURE — 1126F PR PAIN SEVERITY QUANTIFIED, NO PAIN PRESENT: ICD-10-PCS | Mod: CPTII,,, | Performed by: STUDENT IN AN ORGANIZED HEALTH CARE EDUCATION/TRAINING PROGRAM

## 2023-07-20 PROCEDURE — 1160F PR REVIEW ALL MEDS BY PRESCRIBER/CLIN PHARMACIST DOCUMENTED: ICD-10-PCS | Mod: CPTII,,, | Performed by: STUDENT IN AN ORGANIZED HEALTH CARE EDUCATION/TRAINING PROGRAM

## 2023-10-23 PROBLEM — Z00.00 WELLNESS EXAMINATION: Status: RESOLVED | Noted: 2023-07-19 | Resolved: 2023-10-23

## 2024-01-24 ENCOUNTER — OFFICE VISIT (OUTPATIENT)
Dept: URGENT CARE | Facility: CLINIC | Age: 69
End: 2024-01-24
Payer: COMMERCIAL

## 2024-01-24 VITALS
TEMPERATURE: 98 F | HEIGHT: 66 IN | DIASTOLIC BLOOD PRESSURE: 87 MMHG | WEIGHT: 155 LBS | BODY MASS INDEX: 24.91 KG/M2 | OXYGEN SATURATION: 99 % | HEART RATE: 64 BPM | SYSTOLIC BLOOD PRESSURE: 137 MMHG | RESPIRATION RATE: 17 BRPM

## 2024-01-24 DIAGNOSIS — R30.0 DYSURIA: ICD-10-CM

## 2024-01-24 DIAGNOSIS — N30.01 ACUTE CYSTITIS WITH HEMATURIA: Primary | ICD-10-CM

## 2024-01-24 LAB
BILIRUB UR QL STRIP: NEGATIVE
GLUCOSE UR QL STRIP: NEGATIVE
KETONES UR QL STRIP: NEGATIVE
LEUKOCYTE ESTERASE UR QL STRIP: POSITIVE
PH, POC UA: 5
POC BLOOD, URINE: POSITIVE
POC NITRATES, URINE: NEGATIVE
PROT UR QL STRIP: NEGATIVE
SP GR UR STRIP: 1.01 (ref 1–1.03)
UROBILINOGEN UR STRIP-ACNC: ABNORMAL (ref 0.1–1.1)

## 2024-01-24 PROCEDURE — 81003 URINALYSIS AUTO W/O SCOPE: CPT | Mod: QW,,, | Performed by: FAMILY MEDICINE

## 2024-01-24 PROCEDURE — 99213 OFFICE O/P EST LOW 20 MIN: CPT | Mod: ,,, | Performed by: FAMILY MEDICINE

## 2024-01-24 RX ORDER — SULFAMETHOXAZOLE AND TRIMETHOPRIM 800; 160 MG/1; MG/1
1 TABLET ORAL 2 TIMES DAILY
Qty: 10 TABLET | Refills: 0 | Status: SHIPPED | OUTPATIENT
Start: 2024-01-24 | End: 2024-01-29

## 2024-01-24 NOTE — PATIENT INSTRUCTIONS
Discussed in detail on condition, urine positive for trace blood and leukocytes.  No protein, no nitrates.  Encouraged hydration for now.  With no recent UTIs, deferred cultures today.  Medications as directed.  With no improvement will consider urine cultures.  Voiced understanding  ER precautions with any acute change in symptoms like fever, flank pain, worsening symptoms  Call this clinic for any questions.

## 2024-01-24 NOTE — PROGRESS NOTES
"Subjective:      Patient ID: Leah Pepper is a 68 y.o. female.    Vitals:  height is 5' 6" (1.676 m) and weight is 70.3 kg (155 lb). Her temperature is 98.1 °F (36.7 °C). Her blood pressure is 137/87 and her pulse is 64. Her respiration is 17 and oxygen saturation is 99%.     Chief Complaint: Dysuria (Burning with urination, lower abdominal pressure and cramping. Tried taking tylenol and advil. )    HPI:  68-year-old female present to clinic with concerns of lower abdominal pressure, burning with urination, urgency and frequency.  Symptoms started yesterday.  No blood in the urine.  No fever, no flank pain.  Reviewed the chart, no positive urine cultures in the chart.  Otherwise healthy does not take any prescription medications.  Can not recall the last UTI.    ROS :  Constitutional : _No fever, no fatigue or weakness  Neck : _Negative except HPI  Respiratory :_ No coughing, no shortness of breath  Cardiovascular : _No chest pain, no palpitations  Gastrointestinal : _No nausea, no vomiting  Genitourinary : _No flank pain, no vaginal discharge  Integumentary : _Negative for skin rash   Objective:     Physical Exam  General : Alert and Oriented, No apparent distress, afebrile  Neck - supple  Respiratory : Bilateral equal breath sounds, nonlabored respirations, clear to auscultate   Cardiovascular : Rate rhythm regular, normal volume pulse  Gastrointestinal : Soft, mild suprapubic pressure on palpation , BS X 4 quadrants - normal  Genitourinary : No CVA tenderness Bilateral  Integumentary : Warm, Dry and no rash   Assessment:     1. Acute cystitis with hematuria    2. Dysuria      Plan:   Discussed in detail on condition, urine positive for trace blood and leukocytes.  No protein, no nitrates.  Encouraged hydration for now.  With no recent UTIs, deferred cultures today.  Medications as directed.  With no improvement will consider urine cultures.  Voiced understanding  ER precautions with any acute change in symptoms " like fever, flank pain, worsening symptoms  Call this clinic for any questions.    Acute cystitis with hematuria    Dysuria  -     POCT Urinalysis, Dipstick, Automated, W/O Scope

## 2024-05-07 ENCOUNTER — OFFICE VISIT (OUTPATIENT)
Dept: URGENT CARE | Facility: CLINIC | Age: 69
End: 2024-05-07
Payer: COMMERCIAL

## 2024-05-07 VITALS
WEIGHT: 155 LBS | HEIGHT: 66 IN | SYSTOLIC BLOOD PRESSURE: 122 MMHG | DIASTOLIC BLOOD PRESSURE: 82 MMHG | HEART RATE: 63 BPM | OXYGEN SATURATION: 96 % | RESPIRATION RATE: 16 BRPM | BODY MASS INDEX: 24.91 KG/M2 | TEMPERATURE: 98 F

## 2024-05-07 DIAGNOSIS — L03.032 ACUTE PARONYCHIA OF TOE OF LEFT FOOT: Primary | ICD-10-CM

## 2024-05-07 PROCEDURE — 99213 OFFICE O/P EST LOW 20 MIN: CPT | Mod: ,,, | Performed by: FAMILY MEDICINE

## 2024-05-07 RX ORDER — CEPHALEXIN 500 MG/1
500 CAPSULE ORAL EVERY 8 HOURS
Qty: 15 CAPSULE | Refills: 0 | Status: SHIPPED | OUTPATIENT
Start: 2024-05-07 | End: 2024-05-12

## 2024-05-07 NOTE — PROGRESS NOTES
"Subjective:      Patient ID: Leah Pepper is a 69 y.o. female.    Vitals:  height is 5' 6" (1.676 m) and weight is 70.3 kg (155 lb). Her temperature is 98.3 °F (36.8 °C). Her blood pressure is 122/82 and her pulse is 63. Her respiration is 16 and oxygen saturation is 96%.     Chief Complaint: Toe Pain     Patient is a 69 y.o. female who presents to urgent care with complaints of toe pain on left foot. Redness, pain, swelling.  Alleviating factors include advil with no relief. Denies injury.     ROS :  Constitutional : No fever, no fatigue  Neck : Negative except HPI  Respiratory : No shortness of breath, no wheezing  Cardiovascular : No chest pain  Musculoskeletal : Negative except HPI  Integumentary : No rash, no abnormal lesion  Neurological : Negative for tingling numbness and weakness      Patient is otherwise healthy, does not take any prescription medication.  Not allergic to any medication.  States was traveling and  wearing close to shoes.  No concerns of bite.  No similar complaints in the past  Objective:     Physical Exam  General : Alert and oriented, No apparent distress, Afebrile  Neck -: supple, Non Tender  Respiratory :Lungs are clear to auscultate, Breath sounds are equal  Cardiovascular : Normal rate, normal volume pulse bilateral  Musculoskeletal :  left foot 2nd toe base of the nail appears erythematous swollen, tender to palpate.  Toenail appears thin and healthy no concerns of ingrown toenail  Integumentary : Warm, Dry     Assessment:     1. Acute paronychia of toe of left foot      Plan:    Discussed the condition course and clinical diagnosis in detail.  Voiced understanding.  Medications as directed.    Can take Tylenol or ibuprofen for pain and discomfort.  Call or return to clinic for any questions    Acute paronychia of toe of left foot  -     cephALEXin (KEFLEX) 500 MG capsule; Take 1 capsule (500 mg total) by mouth every 8 (eight) hours. for 5 days  Dispense: 15 capsule; Refill: " 0

## 2024-05-07 NOTE — PATIENT INSTRUCTIONS
Discussed the condition course and clinical diagnosis in detail.  Voiced understanding.  Medications as directed.    Can take Tylenol or ibuprofen for pain and discomfort.  Call or return to clinic for any questions

## 2024-05-10 ENCOUNTER — OFFICE VISIT (OUTPATIENT)
Dept: URGENT CARE | Facility: CLINIC | Age: 69
End: 2024-05-10
Payer: COMMERCIAL

## 2024-05-10 VITALS
WEIGHT: 155 LBS | OXYGEN SATURATION: 97 % | DIASTOLIC BLOOD PRESSURE: 88 MMHG | TEMPERATURE: 98 F | HEIGHT: 66 IN | SYSTOLIC BLOOD PRESSURE: 132 MMHG | HEART RATE: 80 BPM | RESPIRATION RATE: 18 BRPM | BODY MASS INDEX: 24.91 KG/M2

## 2024-05-10 DIAGNOSIS — M25.50 ARTHRALGIA, UNSPECIFIED JOINT: ICD-10-CM

## 2024-05-10 DIAGNOSIS — M25.532 PAIN AND SWELLING OF LEFT WRIST: Primary | ICD-10-CM

## 2024-05-10 DIAGNOSIS — M25.432 PAIN AND SWELLING OF LEFT WRIST: Primary | ICD-10-CM

## 2024-05-10 LAB
ALBUMIN SERPL-MCNC: 3.8 G/DL (ref 3.4–4.8)
ALBUMIN/GLOB SERPL: 1 RATIO (ref 1.1–2)
ALP SERPL-CCNC: 71 UNIT/L (ref 40–150)
ALT SERPL-CCNC: 25 UNIT/L (ref 0–55)
AST SERPL-CCNC: 20 UNIT/L (ref 5–34)
BASOPHILS # BLD AUTO: 0.02 X10(3)/MCL
BASOPHILS NFR BLD AUTO: 0.2 %
BILIRUB SERPL-MCNC: 0.6 MG/DL
BUN SERPL-MCNC: 13.4 MG/DL (ref 9.8–20.1)
CALCIUM SERPL-MCNC: 9.4 MG/DL (ref 8.4–10.2)
CHLORIDE SERPL-SCNC: 109 MMOL/L (ref 98–107)
CO2 SERPL-SCNC: 20 MMOL/L (ref 23–31)
CREAT SERPL-MCNC: 0.98 MG/DL (ref 0.55–1.02)
EOSINOPHIL # BLD AUTO: 0.03 X10(3)/MCL (ref 0–0.9)
EOSINOPHIL NFR BLD AUTO: 0.4 %
ERYTHROCYTE [DISTWIDTH] IN BLOOD BY AUTOMATED COUNT: 12 % (ref 11.5–17)
ERYTHROCYTE [SEDIMENTATION RATE] IN BLOOD: 41 MM/HR (ref 0–20)
GFR SERPLBLD CREATININE-BSD FMLA CKD-EPI: >60 ML/MIN/1.73/M2
GLOBULIN SER-MCNC: 3.7 GM/DL (ref 2.4–3.5)
GLUCOSE SERPL-MCNC: 106 MG/DL (ref 82–115)
HCT VFR BLD AUTO: 38.1 % (ref 37–47)
HGB BLD-MCNC: 13.2 G/DL (ref 12–16)
IMM GRANULOCYTES # BLD AUTO: 0.02 X10(3)/MCL (ref 0–0.04)
IMM GRANULOCYTES NFR BLD AUTO: 0.2 %
LYMPHOCYTES # BLD AUTO: 1.32 X10(3)/MCL (ref 0.6–4.6)
LYMPHOCYTES NFR BLD AUTO: 16 %
MCH RBC QN AUTO: 32.3 PG (ref 27–31)
MCHC RBC AUTO-ENTMCNC: 34.6 G/DL (ref 33–36)
MCV RBC AUTO: 93.2 FL (ref 80–94)
MONOCYTES # BLD AUTO: 0.4 X10(3)/MCL (ref 0.1–1.3)
MONOCYTES NFR BLD AUTO: 4.9 %
NEUTROPHILS # BLD AUTO: 6.44 X10(3)/MCL (ref 2.1–9.2)
NEUTROPHILS NFR BLD AUTO: 78.3 %
NRBC BLD AUTO-RTO: 0 %
PLATELET # BLD AUTO: 258 X10(3)/MCL (ref 130–400)
PMV BLD AUTO: 10.4 FL (ref 7.4–10.4)
POTASSIUM SERPL-SCNC: 3.9 MMOL/L (ref 3.5–5.1)
PROT SERPL-MCNC: 7.5 GM/DL (ref 5.8–7.6)
RBC # BLD AUTO: 4.09 X10(6)/MCL (ref 4.2–5.4)
RHEUMATOID FACT SERPL-ACNC: <13 IU/ML
SODIUM SERPL-SCNC: 138 MMOL/L (ref 136–145)
URATE SERPL-MCNC: 4.5 MG/DL (ref 2.6–6)
WBC # SPEC AUTO: 8.23 X10(3)/MCL (ref 4.5–11.5)

## 2024-05-10 PROCEDURE — 86431 RHEUMATOID FACTOR QUANT: CPT | Mod: 59 | Performed by: FAMILY MEDICINE

## 2024-05-10 PROCEDURE — 86038 ANTINUCLEAR ANTIBODIES: CPT | Performed by: FAMILY MEDICINE

## 2024-05-10 PROCEDURE — 99213 OFFICE O/P EST LOW 20 MIN: CPT | Mod: 25,,, | Performed by: FAMILY MEDICINE

## 2024-05-10 PROCEDURE — 84550 ASSAY OF BLOOD/URIC ACID: CPT | Performed by: FAMILY MEDICINE

## 2024-05-10 PROCEDURE — 96372 THER/PROPH/DIAG INJ SC/IM: CPT | Mod: ,,, | Performed by: FAMILY MEDICINE

## 2024-05-10 PROCEDURE — 36415 COLL VENOUS BLD VENIPUNCTURE: CPT | Performed by: FAMILY MEDICINE

## 2024-05-10 PROCEDURE — 86200 CCP ANTIBODY: CPT | Performed by: FAMILY MEDICINE

## 2024-05-10 PROCEDURE — 80053 COMPREHEN METABOLIC PANEL: CPT | Performed by: FAMILY MEDICINE

## 2024-05-10 PROCEDURE — 85652 RBC SED RATE AUTOMATED: CPT | Performed by: FAMILY MEDICINE

## 2024-05-10 PROCEDURE — 86431 RHEUMATOID FACTOR QUANT: CPT | Performed by: FAMILY MEDICINE

## 2024-05-10 PROCEDURE — 85025 COMPLETE CBC W/AUTO DIFF WBC: CPT | Performed by: FAMILY MEDICINE

## 2024-05-10 RX ORDER — PREDNISONE 20 MG/1
20 TABLET ORAL 2 TIMES DAILY
Qty: 6 TABLET | Refills: 0 | Status: SHIPPED | OUTPATIENT
Start: 2024-05-11 | End: 2024-05-14

## 2024-05-10 RX ORDER — BETAMETHASONE SODIUM PHOSPHATE AND BETAMETHASONE ACETATE 3; 3 MG/ML; MG/ML
6 INJECTION, SUSPENSION INTRA-ARTICULAR; INTRALESIONAL; INTRAMUSCULAR; SOFT TISSUE
Status: COMPLETED | OUTPATIENT
Start: 2024-05-10 | End: 2024-05-10

## 2024-05-10 RX ADMIN — BETAMETHASONE SODIUM PHOSPHATE AND BETAMETHASONE ACETATE 6 MG: 3; 3 INJECTION, SUSPENSION INTRA-ARTICULAR; INTRALESIONAL; INTRAMUSCULAR; SOFT TISSUE at 10:05

## 2024-05-10 NOTE — PATIENT INSTRUCTIONS
Considering acute or new onset symptoms, x-rays left wrist today.  No concerns of fracture or dislocation.  Arthritic changes noted.  Radiology final results will be monitored and reported  Encouraged ice and elevation for now.  [facility] IM today as anti inflammation for symptom relief, risk and benefits discussed  Oral prednisone for continuing anti inflammation to start from tomorrow for persistent and worsening symptoms  Aleve or ibuprofen for pain as needed, call or return to clinic for any questions.  ER precautions    Considering multiple joint acute onset pain labs today.  Results will be monitored on reported.  Differential diagnosis discussed as well, nonspecific arthritis, gout, reactive arthritis, rheumatoid arthritis.  Patient voiced understanding.  Once we have the lab results, with ongoing and worsening symptoms may have to follow up with a specialist.  Will call patient with the results.  Voiced understanding

## 2024-05-10 NOTE — PROGRESS NOTES
"Subjective:      Patient ID: Leah Pepper is a 69 y.o. female.    Vitals:  height is 5' 6" (1.676 m) and weight is 70.3 kg (155 lb). Her temperature is 97.7 °F (36.5 °C). Her blood pressure is 132/88 and her pulse is 80. Her respiration is 18 and oxygen saturation is 97%.     Chief Complaint: paronychia of toe     Patient is a 69 y.o. female who presents to urgent care with complaints of left foot second toe red and swollen, all joints are painful and left hand swollen and painful no injury to hand x4 days. Alleviating factors include advil with mild amount of relief. Patient denies fever.     ROS :  Constitutional : No fever, no fatigue  Neck : Negative except HPI  Respiratory : No shortness of breath, no wheezing  Cardiovascular : No chest pain  Musculoskeletal : Negative except HPI  Integumentary : No rash, no abnormal lesion  Neurological : Negative for tingling numbness and weakness     Reviewed notes from 4 days ago.  Reviewed nurse's notes and vital signs as well.  No fever.  Patient is otherwise healthy does not take any prescription medication.  Appears concerned with worsening symptoms and noticing pain bilateral shoulder, bilateral hip, neck pain.  No history of arthritis in the past.  No fall or trauma.  Left wrist painful and swollen.  No history of gout in the past.  Last lab results in the chart is from 2022.  Denies history of early morning stiffness in the joints.  Currently with this new onset symptoms feels some stiffness in shoulder joint  Has tolerated cortisone injection in the past with no issues.  Understands the risks and benefits  Appears concerned with possible allergic reaction to Keflex.  Patient has tolerated penicillin in the past with no issues.  Encouraged to hold Keflex for now    Objective:     Physical Exam  General : Alert and oriented, No apparent distress, Afebrile, appears comfortable sitting on the exam chair, clear speech and appropriate communication  Neck -: supple, Non " Tender  Respiratory :Lungs are clear to auscultate, Breath sounds are equal  Cardiovascular : Normal rate, normal volume pulse bilateral  Musculoskeletal :  Left foot 2nd toe dorsum at the base of the toenail, mild redness and fullness present, appears better than last visit.  Left wrist appears swollen, range of motion limited due to pain.  No point tenderness.  Left fingers range of motion present.  Bilateral shoulder and hip subjective pain.  Range of motion present.  No point tenderness.  Integumentary : Warm, Dry  Neurologic : Alert and Oriented X 4, sensations intact, motor intact   Assessment:     1. Pain and swelling of left wrist    2. Arthralgia, unspecified joint      Plan:   Considering acute or new onset symptoms, x-rays left wrist today.  No concerns of fracture or dislocation.  Arthritic changes noted.  Radiology final results will be monitored and reported  Encouraged ice and elevation for now.  [facility] IM today as anti inflammation for symptom relief, risk and benefits discussed  Oral prednisone for continuing anti inflammation to start from tomorrow for persistent and worsening symptoms  Aleve or ibuprofen for pain as needed, call or return to clinic for any questions.  ER precautions    Considering multiple joint acute onset pain labs today.  Results will be monitored on reported.  Differential diagnosis discussed as well, nonspecific arthritis, gout, reactive arthritis, rheumatoid arthritis.  Patient voiced understanding.  Once we have the lab results, with ongoing and worsening symptoms may have to follow up with a specialist.  Will call patient with the results.  Voiced understanding    Pain and swelling of left wrist  -     XR WRIST COMPLETE 3 VIEWS LEFT; Future; Expected date: 05/10/2024  -     betamethasone acetate-betamethasone sodium phosphate injection 6 mg  -     predniSONE (DELTASONE) 20 MG tablet; Take 1 tablet (20 mg total) by mouth 2 (two) times daily. for 3 days  Dispense: 6  tablet; Refill: 0    Arthralgia, unspecified joint  -     CBC Auto Differential  -     Sedimentation rate; Future; Expected date: 05/10/2024  -     Uric Acid; Future; Expected date: 05/10/2024  -     Comprehensive Metabolic Panel  -     ALIE by IFA, w/Rflx; Future; Expected date: 05/10/2024  -     Rheumatoid Factors, IgA, IgG, IgM; Future; Expected date: 05/10/2024  -     betamethasone acetate-betamethasone sodium phosphate injection 6 mg  -     predniSONE (DELTASONE) 20 MG tablet; Take 1 tablet (20 mg total) by mouth 2 (two) times daily. for 3 days  Dispense: 6 tablet; Refill: 0

## 2024-05-12 LAB
RHEUMATOID FACTOR IGA QUANTITATIVE (OLG): 2.7 IU/ML
RHEUMATOID FACTOR IGM QUANTITATIVE (OLG): 0.8 IU/ML

## 2024-05-13 LAB
CCP IGG SERPL-ACNC: <15.6 U
IMMUNOLOGIST REVIEW: ABNORMAL
NUCLEAR IGG SERPL IA-ACNC: 1.3 U

## 2024-05-14 ENCOUNTER — OFFICE VISIT (OUTPATIENT)
Dept: PRIMARY CARE CLINIC | Facility: CLINIC | Age: 69
End: 2024-05-14
Payer: COMMERCIAL

## 2024-05-14 VITALS
HEIGHT: 66 IN | SYSTOLIC BLOOD PRESSURE: 131 MMHG | OXYGEN SATURATION: 96 % | WEIGHT: 162.38 LBS | BODY MASS INDEX: 26.09 KG/M2 | DIASTOLIC BLOOD PRESSURE: 77 MMHG | HEART RATE: 86 BPM

## 2024-05-14 DIAGNOSIS — M25.40 SWELLING OF MULTIPLE JOINTS: Primary | ICD-10-CM

## 2024-05-14 DIAGNOSIS — R76.8 POSITIVE ANA (ANTINUCLEAR ANTIBODY): ICD-10-CM

## 2024-05-14 PROCEDURE — 3008F BODY MASS INDEX DOCD: CPT | Mod: CPTII,,, | Performed by: STUDENT IN AN ORGANIZED HEALTH CARE EDUCATION/TRAINING PROGRAM

## 2024-05-14 PROCEDURE — 3078F DIAST BP <80 MM HG: CPT | Mod: CPTII,,, | Performed by: STUDENT IN AN ORGANIZED HEALTH CARE EDUCATION/TRAINING PROGRAM

## 2024-05-14 PROCEDURE — 99214 OFFICE O/P EST MOD 30 MIN: CPT | Mod: ,,, | Performed by: STUDENT IN AN ORGANIZED HEALTH CARE EDUCATION/TRAINING PROGRAM

## 2024-05-14 PROCEDURE — 3075F SYST BP GE 130 - 139MM HG: CPT | Mod: CPTII,,, | Performed by: STUDENT IN AN ORGANIZED HEALTH CARE EDUCATION/TRAINING PROGRAM

## 2024-05-14 PROCEDURE — 1159F MED LIST DOCD IN RCRD: CPT | Mod: CPTII,,, | Performed by: STUDENT IN AN ORGANIZED HEALTH CARE EDUCATION/TRAINING PROGRAM

## 2024-05-14 PROCEDURE — 3288F FALL RISK ASSESSMENT DOCD: CPT | Mod: CPTII,,, | Performed by: STUDENT IN AN ORGANIZED HEALTH CARE EDUCATION/TRAINING PROGRAM

## 2024-05-14 PROCEDURE — 1160F RVW MEDS BY RX/DR IN RCRD: CPT | Mod: CPTII,,, | Performed by: STUDENT IN AN ORGANIZED HEALTH CARE EDUCATION/TRAINING PROGRAM

## 2024-05-14 PROCEDURE — 1101F PT FALLS ASSESS-DOCD LE1/YR: CPT | Mod: CPTII,,, | Performed by: STUDENT IN AN ORGANIZED HEALTH CARE EDUCATION/TRAINING PROGRAM

## 2024-05-14 PROCEDURE — 1125F AMNT PAIN NOTED PAIN PRSNT: CPT | Mod: CPTII,,, | Performed by: STUDENT IN AN ORGANIZED HEALTH CARE EDUCATION/TRAINING PROGRAM

## 2024-05-14 RX ORDER — MELOXICAM 7.5 MG/1
7.5 TABLET ORAL DAILY
Qty: 60 TABLET | Refills: 1 | Status: SHIPPED | OUTPATIENT
Start: 2024-05-14 | End: 2024-05-14

## 2024-05-14 RX ORDER — MELOXICAM 7.5 MG/1
7.5 TABLET ORAL 2 TIMES DAILY
Qty: 60 TABLET | Refills: 1 | Status: SHIPPED | OUTPATIENT
Start: 2024-05-14

## 2024-05-14 NOTE — PROGRESS NOTES
Date: 05/14/2024  Patient ID: [id number]   Chief Complaint: Follow-up (Went to  for swollen left second toe, possible bite, joints started to swell, left hand swollen,right side of neck to shoulder pain, ankle swelling)    HPI:   Leah Pepper is a 69 y.o. female here today for Follow-up (Went to  for swollen left second toe, possible bite, joints started to swell, left hand swollen,right side of neck to shoulder pain, ankle swelling)  Patient went to [employer] [place] where she danced and had alcohol. The next day her left 2nd toe started swelling, became red and painful.  A few days later after coming back home, she went to urgent care where she received Keflex.  She stopped taking it after 3 days since she developed left hand swelling as well as left ankle and whole foot swelling.  She denied any pain in the joints.  She went back to urgent care few days later where she received a steroid injection and steroid pills, which initially helped but did not provide much improvement of the swelling.  Urgent care obtained imaging which showed no acute abnormalities of the wrist.  Labs showed positive ALIE, elevated ESR, wnl CMP and uric acid level. Advil helps some during the day. Has difficulty sleeping 2/2 pain.    Patient Active Problem List   Diagnosis    Swelling of multiple joints    Positive ALIE (antinuclear antibody)     No outpatient medications have been marked as taking for the 5/14/24 encounter (Office Visit) with Janeth Tian MD.     Past Medical History:   Diagnosis Date    No known health problems      Past Surgical History:   Procedure Laterality Date    COSMETIC SURGERY       Review of patient's allergies indicates:   Allergen Reactions    Diphenhydramine      Other reaction(s): heart races     Family History   Problem Relation Name Age of Onset    No Known Problems Mother      Heart disease Father Alejandro     Diabetes Father Alejandro     No Known Problems Sister      No Known Problems Brother        Social  "History     Socioeconomic History    Marital status:    Tobacco Use    Smoking status: Former     Current packs/day: 0.25     Average packs/day: 0.3 packs/day for 10.0 years (2.5 ttl pk-yrs)     Types: Cigarettes    Smokeless tobacco: Never   Substance and Sexual Activity    Alcohol use: Yes     Alcohol/week: 3.0 standard drinks of alcohol     Types: 3 Glasses of wine per week     Comment: ocassional    Drug use: Never    Sexual activity: Yes     Partners: Male     Social Determinants of Health     Financial Resource Strain: Low Risk  (5/12/2024)    Overall Financial Resource Strain (CARDIA)     Difficulty of Paying Living Expenses: Not hard at all   Food Insecurity: No Food Insecurity (5/12/2024)    Hunger Vital Sign     Worried About Running Out of Food in the Last Year: Never true     Ran Out of Food in the Last Year: Never true   Transportation Needs: No Transportation Needs (5/12/2024)    PRAPARE - Transportation     Lack of Transportation (Medical): No     Lack of Transportation (Non-Medical): No   Physical Activity: Unknown (5/12/2024)    Exercise Vital Sign     Days of Exercise per Week: 2 days   Stress: No Stress Concern Present (5/12/2024)    [employer] - Occupational Stress Questionnaire     Feeling of Stress : Not at all   Housing Stability: Unknown (5/12/2024)    Housing Stability Vital Sign     Unable to Pay for Housing in the Last Year: No     Patient Care Team:  Janeth Tian MD as PCP - General (Family Medicine)   Subjective:   ROS  See HPI for details  All Other ROS: Negative except as stated in HPI.   Objective:   /77   Pulse 86   Ht 5' 6" (1.676 m)   Wt 73.7 kg (162 lb 6.4 oz)   SpO2 96%   BMI 26.21 kg/m²   Physical Exam  Constitutional:       General: She is not in acute distress.  Musculoskeletal:      Comments: Left hand and left ankle and foot and 1st and 2nd toe swelling with 2nd toe with erythema.  Left wrist tender to palpation on volar " radial aspect and with Phalen test.  Feet N  TTP with NV intact   Neurological:      Mental Status: She is alert.       Assessment:       ICD-10-CM ICD-9-CM   1. Swelling of multiple joints  M25.40 719.09   2. Positive ALIE (antinuclear antibody)  R76.8 795.79      Plan:   1. Swelling of multiple joints  Assessment & Plan:  Could be related to h/o alcohol use or Keflex   Refer to rheumatology  Attempt Meloxicam bid   Obtain BL feet XR  Monitor for improvement    Orders:  -     Discontinue: meloxicam (MOBIC) 7.5 MG tablet; Take 1 tablet (7.5 mg total) by mouth once daily.  Dispense: 60 tablet; Refill: 1  -     Ambulatory referral/consult to Rheumatology; Future; Expected date: 05/21/2024  -     X-Ray Foot Complete Bilateral; Future; Expected date: 05/14/2024  -     meloxicam (MOBIC) 7.5 MG tablet; Take 1 tablet (7.5 mg total) by mouth 2 (two) times a day.  Dispense: 60 tablet; Refill: 1    2. Positive ALIE (antinuclear antibody)  Assessment & Plan:  Refer to rheumatology  Attempt Meloxicam bid     Orders:  -     Ambulatory referral/consult to Rheumatology; Future; Expected date: 05/21/2024         Medication List with Changes/Refills   New Medications    MELOXICAM (MOBIC) 7.5 MG TABLET    Take 1 tablet (7.5 mg total) by mouth 2 (two) times a day.       Start Date: 5/14/2024 End Date: --   Current Medications    PREDNISONE (DELTASONE) 20 MG TABLET    Take 1 tablet (20 mg total) by mouth 2 (two) times daily. for 3 days       Start Date: 5/11/2024 End Date: 5/14/2024        Follow up in about 3 weeks (around 6/4/2024) for multiple joint pain and imaging results. In addition to their scheduled follow up, the patient has also been instructed to follow up on as needed basis.

## 2024-05-14 NOTE — ASSESSMENT & PLAN NOTE
Could be related to h/o alcohol use or Keflex   Refer to rheumatology  Attempt Meloxicam bid   Obtain BL feet XR  Monitor for improvement

## 2024-05-15 ENCOUNTER — HOSPITAL ENCOUNTER (EMERGENCY)
Facility: HOSPITAL | Age: 69
Discharge: HOME | End: 2024-05-15
Attending: STUDENT IN AN ORGANIZED HEALTH CARE EDUCATION/TRAINING PROGRAM
Payer: COMMERCIAL

## 2024-05-15 VITALS
OXYGEN SATURATION: 98 % | WEIGHT: 162 LBS | TEMPERATURE: 99 F | BODY MASS INDEX: 26.03 KG/M2 | SYSTOLIC BLOOD PRESSURE: 131 MMHG | DIASTOLIC BLOOD PRESSURE: 78 MMHG | HEART RATE: 71 BPM | HEIGHT: 66 IN | RESPIRATION RATE: 17 BRPM

## 2024-05-15 DIAGNOSIS — M79.89 LEG SWELLING: ICD-10-CM

## 2024-05-15 DIAGNOSIS — R76.8 POSITIVE ANA (ANTINUCLEAR ANTIBODY): ICD-10-CM

## 2024-05-15 DIAGNOSIS — M79.672 LEFT FOOT PAIN: ICD-10-CM

## 2024-05-15 DIAGNOSIS — J18.9 PNEUMONIA OF RIGHT LOWER LOBE DUE TO INFECTIOUS ORGANISM: Primary | ICD-10-CM

## 2024-05-15 DIAGNOSIS — M25.50 ARTHRALGIA, UNSPECIFIED JOINT: ICD-10-CM

## 2024-05-15 DIAGNOSIS — R60.9 EDEMA, UNSPECIFIED TYPE: ICD-10-CM

## 2024-05-15 DIAGNOSIS — L03.90 CELLULITIS, UNSPECIFIED CELLULITIS SITE: ICD-10-CM

## 2024-05-15 DIAGNOSIS — R07.9 CHEST PAIN: ICD-10-CM

## 2024-05-15 LAB
ALBUMIN SERPL-MCNC: 3.2 G/DL (ref 3.4–4.8)
ALBUMIN/GLOB SERPL: 0.9 RATIO (ref 1.1–2)
ALP SERPL-CCNC: 61 UNIT/L (ref 40–150)
ALT SERPL-CCNC: 45 UNIT/L (ref 0–55)
APTT PPP: 27.9 SECONDS (ref 23.2–33.7)
AST SERPL-CCNC: 25 UNIT/L (ref 5–34)
BACTERIA #/AREA URNS AUTO: ABNORMAL /HPF
BASOPHILS # BLD AUTO: 0.01 X10(3)/MCL
BASOPHILS NFR BLD AUTO: 0.1 %
BILIRUB SERPL-MCNC: 0.6 MG/DL
BILIRUB UR QL STRIP.AUTO: NEGATIVE
BNP BLD-MCNC: 94.1 PG/ML
BUN SERPL-MCNC: 22 MG/DL (ref 9.8–20.1)
CALCIUM SERPL-MCNC: 9.2 MG/DL (ref 8.4–10.2)
CHLORIDE SERPL-SCNC: 107 MMOL/L (ref 98–107)
CLARITY UR: CLEAR
CO2 SERPL-SCNC: 29 MMOL/L (ref 23–31)
COLOR UR AUTO: ABNORMAL
CREAT SERPL-MCNC: 1.03 MG/DL (ref 0.55–1.02)
CRP SERPL-MCNC: 100.1 MG/L
D DIMER PPP IA.FEU-MCNC: 0.87 UG/ML FEU (ref 0–0.5)
EOSINOPHIL # BLD AUTO: 0.04 X10(3)/MCL (ref 0–0.9)
EOSINOPHIL NFR BLD AUTO: 0.4 %
ERYTHROCYTE [DISTWIDTH] IN BLOOD BY AUTOMATED COUNT: 11.9 % (ref 11.5–17)
FLUAV AG UPPER RESP QL IA.RAPID: NOT DETECTED
FLUBV AG UPPER RESP QL IA.RAPID: NOT DETECTED
GFR SERPLBLD CREATININE-BSD FMLA CKD-EPI: 58 ML/MIN/1.73/M2
GLOBULIN SER-MCNC: 3.7 GM/DL (ref 2.4–3.5)
GLUCOSE SERPL-MCNC: 84 MG/DL (ref 82–115)
GLUCOSE UR QL STRIP: NORMAL
HCT VFR BLD AUTO: 36.3 % (ref 37–47)
HGB BLD-MCNC: 12.3 G/DL (ref 12–16)
HGB UR QL STRIP: NEGATIVE
IMM GRANULOCYTES # BLD AUTO: 0.04 X10(3)/MCL (ref 0–0.04)
IMM GRANULOCYTES NFR BLD AUTO: 0.4 %
INR PPP: 1.1
KETONES UR QL STRIP: NEGATIVE
LACTATE SERPL-SCNC: 1 MMOL/L (ref 0.5–2.2)
LEUKOCYTE ESTERASE UR QL STRIP: 75
LYMPHOCYTES # BLD AUTO: 1.69 X10(3)/MCL (ref 0.6–4.6)
LYMPHOCYTES NFR BLD AUTO: 19 %
MCH RBC QN AUTO: 32.2 PG (ref 27–31)
MCHC RBC AUTO-ENTMCNC: 33.9 G/DL (ref 33–36)
MCV RBC AUTO: 95 FL (ref 80–94)
MONOCYTES # BLD AUTO: 0.86 X10(3)/MCL (ref 0.1–1.3)
MONOCYTES NFR BLD AUTO: 9.7 %
MUCOUS THREADS URNS QL MICRO: ABNORMAL /LPF
NEUTROPHILS # BLD AUTO: 6.27 X10(3)/MCL (ref 2.1–9.2)
NEUTROPHILS NFR BLD AUTO: 70.4 %
NITRITE UR QL STRIP: NEGATIVE
NRBC BLD AUTO-RTO: 0 %
OHS QRS DURATION: 76 MS
OHS QTC CALCULATION: 402 MS
PH UR STRIP: 5.5 [PH]
PLATELET # BLD AUTO: 309 X10(3)/MCL (ref 130–400)
PMV BLD AUTO: 10 FL (ref 7.4–10.4)
POTASSIUM SERPL-SCNC: 4.5 MMOL/L (ref 3.5–5.1)
PROT SERPL-MCNC: 6.9 GM/DL (ref 5.8–7.6)
PROT UR QL STRIP: NEGATIVE
PROTHROMBIN TIME: 13.8 SECONDS (ref 12.5–14.5)
RBC # BLD AUTO: 3.82 X10(6)/MCL (ref 4.2–5.4)
RBC #/AREA URNS AUTO: ABNORMAL /HPF
SARS-COV-2 RNA RESP QL NAA+PROBE: NOT DETECTED
SODIUM SERPL-SCNC: 142 MMOL/L (ref 136–145)
SP GR UR STRIP.AUTO: 1.03 (ref 1–1.03)
SQUAMOUS #/AREA URNS LPF: ABNORMAL /HPF
TROPONIN I SERPL-MCNC: <0.01 NG/ML (ref 0–0.04)
UROBILINOGEN UR STRIP-ACNC: NORMAL
WBC # SPEC AUTO: 8.91 X10(3)/MCL (ref 4.5–11.5)
WBC #/AREA URNS AUTO: ABNORMAL /HPF

## 2024-05-15 PROCEDURE — 80053 COMPREHEN METABOLIC PANEL: CPT | Performed by: EMERGENCY MEDICINE

## 2024-05-15 PROCEDURE — 0240U COVID/FLU A&B PCR: CPT | Performed by: STUDENT IN AN ORGANIZED HEALTH CARE EDUCATION/TRAINING PROGRAM

## 2024-05-15 PROCEDURE — 83880 ASSAY OF NATRIURETIC PEPTIDE: CPT | Performed by: STUDENT IN AN ORGANIZED HEALTH CARE EDUCATION/TRAINING PROGRAM

## 2024-05-15 PROCEDURE — 86140 C-REACTIVE PROTEIN: CPT | Performed by: EMERGENCY MEDICINE

## 2024-05-15 PROCEDURE — 85610 PROTHROMBIN TIME: CPT | Performed by: STUDENT IN AN ORGANIZED HEALTH CARE EDUCATION/TRAINING PROGRAM

## 2024-05-15 PROCEDURE — 85025 COMPLETE CBC W/AUTO DIFF WBC: CPT | Performed by: EMERGENCY MEDICINE

## 2024-05-15 PROCEDURE — 96374 THER/PROPH/DIAG INJ IV PUSH: CPT

## 2024-05-15 PROCEDURE — 96375 TX/PRO/DX INJ NEW DRUG ADDON: CPT

## 2024-05-15 PROCEDURE — 83605 ASSAY OF LACTIC ACID: CPT | Performed by: STUDENT IN AN ORGANIZED HEALTH CARE EDUCATION/TRAINING PROGRAM

## 2024-05-15 PROCEDURE — 63600175 PHARM REV CODE 636 W HCPCS: Performed by: STUDENT IN AN ORGANIZED HEALTH CARE EDUCATION/TRAINING PROGRAM

## 2024-05-15 PROCEDURE — 96376 TX/PRO/DX INJ SAME DRUG ADON: CPT | Mod: 59

## 2024-05-15 PROCEDURE — 84484 ASSAY OF TROPONIN QUANT: CPT | Performed by: STUDENT IN AN ORGANIZED HEALTH CARE EDUCATION/TRAINING PROGRAM

## 2024-05-15 PROCEDURE — 85379 FIBRIN DEGRADATION QUANT: CPT | Performed by: STUDENT IN AN ORGANIZED HEALTH CARE EDUCATION/TRAINING PROGRAM

## 2024-05-15 PROCEDURE — 36415 COLL VENOUS BLD VENIPUNCTURE: CPT | Performed by: EMERGENCY MEDICINE

## 2024-05-15 PROCEDURE — 87040 BLOOD CULTURE FOR BACTERIA: CPT | Performed by: STUDENT IN AN ORGANIZED HEALTH CARE EDUCATION/TRAINING PROGRAM

## 2024-05-15 PROCEDURE — 81001 URINALYSIS AUTO W/SCOPE: CPT | Performed by: STUDENT IN AN ORGANIZED HEALTH CARE EDUCATION/TRAINING PROGRAM

## 2024-05-15 PROCEDURE — 85730 THROMBOPLASTIN TIME PARTIAL: CPT | Performed by: STUDENT IN AN ORGANIZED HEALTH CARE EDUCATION/TRAINING PROGRAM

## 2024-05-15 PROCEDURE — 99285 EMERGENCY DEPT VISIT HI MDM: CPT | Mod: 25

## 2024-05-15 PROCEDURE — 93010 ELECTROCARDIOGRAM REPORT: CPT | Mod: ,,, | Performed by: INTERNAL MEDICINE

## 2024-05-15 PROCEDURE — 25500020 PHARM REV CODE 255: Performed by: STUDENT IN AN ORGANIZED HEALTH CARE EDUCATION/TRAINING PROGRAM

## 2024-05-15 PROCEDURE — 93005 ELECTROCARDIOGRAM TRACING: CPT

## 2024-05-15 RX ORDER — HYDROCODONE BITARTRATE AND ACETAMINOPHEN 5; 325 MG/1; MG/1
1 TABLET ORAL EVERY 8 HOURS PRN
Qty: 15 TABLET | Refills: 0 | Status: SHIPPED | OUTPATIENT
Start: 2024-05-15 | End: 2024-05-20

## 2024-05-15 RX ORDER — AZITHROMYCIN 250 MG/1
250 TABLET, FILM COATED ORAL DAILY
Qty: 6 TABLET | Refills: 0 | Status: SHIPPED | OUTPATIENT
Start: 2024-05-15

## 2024-05-15 RX ORDER — MORPHINE SULFATE 4 MG/ML
4 INJECTION, SOLUTION INTRAMUSCULAR; INTRAVENOUS
Status: COMPLETED | OUTPATIENT
Start: 2024-05-15 | End: 2024-05-15

## 2024-05-15 RX ORDER — ONDANSETRON HYDROCHLORIDE 2 MG/ML
4 INJECTION, SOLUTION INTRAVENOUS
Status: COMPLETED | OUTPATIENT
Start: 2024-05-15 | End: 2024-05-15

## 2024-05-15 RX ORDER — AMOXICILLIN AND CLAVULANATE POTASSIUM 875; 125 MG/1; MG/1
1 TABLET, FILM COATED ORAL 2 TIMES DAILY
Qty: 14 TABLET | Refills: 0 | Status: SHIPPED | OUTPATIENT
Start: 2024-05-15

## 2024-05-15 RX ORDER — FUROSEMIDE 20 MG/1
20 TABLET ORAL DAILY
Qty: 30 TABLET | Refills: 0 | Status: SHIPPED | OUTPATIENT
Start: 2024-05-15 | End: 2024-06-14

## 2024-05-15 RX ORDER — ONDANSETRON 4 MG/1
4 TABLET, ORALLY DISINTEGRATING ORAL EVERY 8 HOURS PRN
Qty: 15 TABLET | Refills: 0 | Status: SHIPPED | OUTPATIENT
Start: 2024-05-15 | End: 2024-05-20

## 2024-05-15 RX ADMIN — MORPHINE SULFATE 4 MG: 4 INJECTION INTRAVENOUS at 03:05

## 2024-05-15 RX ADMIN — MORPHINE SULFATE 4 MG: 4 INJECTION INTRAVENOUS at 10:05

## 2024-05-15 RX ADMIN — IOHEXOL 75 ML: 350 INJECTION, SOLUTION INTRAVENOUS at 01:05

## 2024-05-15 RX ADMIN — ONDANSETRON 4 MG: 2 INJECTION INTRAMUSCULAR; INTRAVENOUS at 10:05

## 2024-05-15 NOTE — ED PROVIDER NOTES
Encounter Date: 5/15/2024    SCRIBE #1 NOTE: I, Sadie Chaney, am scribing for, and in the presence of,  Karl Zhu MD. I have scribed the following portions of the note - the EKG reading. Other sections scribed: HPI, ROS, PE.       History     Chief Complaint   Patient presents with    toe swelling     Pt has left 2nd toe swelling since May 3 after dancing barefoot in [place]. [place] and were placed on keflex May7  without improvement. Pt returned to clinic on May 10, was also placed on steroids and now has foot and ankle swelling in addition to red swollen toe. Pt was told to stop keflex and was given meloxicam. Pt reports she gained 12 lbs since may 6. Pt reports she also is dribbling since last night.      Patient is a 69 year old female presents to the ED for generalized myalgias beginning 1 week ago. The patient reports going on a river cruise to [facility] and was barefoot the evening of 5/3, however she washed her feet afterward and did not see any foreign bodies or have pain. The next morning she had pain and swelling in the second toe of her left foot. [place] and placed on keflex 5/7 without improvement. She reports having night sweats, joint pain, and left hand pain and swelling after taking her third dose of keflex. Pt returned to clinic on 5/10, was told to stop keflex, and was placed on steroids. She now reports left foot and ankle swelling in addition to red, swollen toe, but says her toe pain has since resolved. Pt was given meloxicam by her PCP Jaylan on 5/14 and says her pain is worse. Pt reports she gained 12 lbs since 5/6. Also report SOB.  Flew back from [place] recently.     The history is provided by the patient. No  was used.     Review of patient's allergies indicates:   Allergen Reactions    Diphenhydramine      Other reaction(s): heart races     Past Medical History:   Diagnosis Date    No known health problems       Past Surgical History:   Procedure Laterality Date    COSMETIC SURGERY       Family History   Problem Relation Name Age of Onset    No Known Problems Mother      Heart disease Father Alejandro     Diabetes Father Alejandro     No Known Problems Sister      No Known Problems Brother       Social History     Tobacco Use    Smoking status: Former     Current packs/day: 0.25     Average packs/day: 0.3 packs/day for 10.0 years (2.5 ttl pk-yrs)     Types: Cigarettes    Smokeless tobacco: Never   Substance Use Topics    Alcohol use: Yes     Alcohol/week: 3.0 standard drinks of alcohol     Types: 3 Glasses of wine per week     Comment: ocassional    Drug use: Never     Review of Systems   Constitutional:  Negative for fever.   HENT:  Negative for sore throat.    Eyes:  Negative for visual disturbance.   Respiratory:  Negative for shortness of breath.    Cardiovascular:  Negative for chest pain.   Gastrointestinal:  Negative for abdominal pain.   Genitourinary:  Negative for dysuria.   Musculoskeletal:  Positive for joint swelling and myalgias.        Left hand pain and swelling, toe swelling, left ankle swelling    Skin:  Negative for rash.   Neurological:  Negative for weakness.   Psychiatric/Behavioral:  Negative for confusion.        Physical Exam     Initial Vitals [05/15/24 0827]   BP Pulse Resp Temp SpO2   (!) 164/93 88 16 98.1 °F (36.7 °C) 96 %      MAP       --         Physical Exam    Nursing note and vitals reviewed.  Constitutional: She appears well-developed and well-nourished. She is not diaphoretic. No distress.   HENT:   Head: Normocephalic and atraumatic.   Eyes: Conjunctivae and EOM are normal. Pupils are equal, round, and reactive to light.   Neck:   Normal range of motion.  Cardiovascular:  Normal rate, regular rhythm, normal heart sounds and intact distal pulses.           No murmur heard.  Pulmonary/Chest: No respiratory distress. She has no wheezes. She has no rales.   Crackles at bases.    Abdominal:  Abdomen is soft. She exhibits no distension. There is no abdominal tenderness.   Musculoskeletal:         General: Normal range of motion.      Cervical back: Normal range of motion.      Comments: 1x1 cm area of erythema to the dorsal aspect of the 2nd digit on left foot. No FB visualized.      Neurological: She is alert and oriented to person, place, and time. No cranial nerve deficit.   Skin: Skin is warm. No rash noted. No erythema.         ED Course   Procedures  Labs Reviewed   COMPREHENSIVE METABOLIC PANEL - Abnormal; Notable for the following components:       Result Value    Blood Urea Nitrogen 22.0 (*)     Creatinine 1.03 (*)     Albumin 3.2 (*)     Globulin 3.7 (*)     Albumin/Globulin Ratio 0.9 (*)     All other components within normal limits   C-REACTIVE PROTEIN - Abnormal; Notable for the following components:    .10 (*)     All other components within normal limits   CBC WITH DIFFERENTIAL - Abnormal; Notable for the following components:    RBC 3.82 (*)     Hct 36.3 (*)     MCV 95.0 (*)     MCH 32.2 (*)     All other components within normal limits   URINALYSIS, REFLEX TO URINE CULTURE - Abnormal; Notable for the following components:    Leukocyte Esterase, UA 75 (*)     WBC, UA 6-10 (*)     Mucous, UA Trace (*)     All other components within normal limits   D DIMER, QUANTITATIVE - Abnormal; Notable for the following components:    D-Dimer 0.87 (*)     All other components within normal limits   BLOOD CULTURE OLG - Normal   BLOOD CULTURE OLG - Normal   B-TYPE NATRIURETIC PEPTIDE - Normal   APTT - Normal   PROTIME-INR - Normal   TROPONIN I - Normal   COVID/FLU A&B PCR - Normal    Narrative:     The Xpert Xpress SARS-CoV-2/FLU/RSV plus is a rapid, multiplexed real-time PCR test intended for the simultaneous qualitative detection and differentiation of SARS-CoV-2, Influenza A, Influenza B, and respiratory syncytial virus (RSV) viral RNA in either nasopharyngeal swab or nasal swab  specimens.         LACTIC ACID, PLASMA - Normal   CBC W/ AUTO DIFFERENTIAL    Narrative:     The following orders were created for panel order CBC auto differential.  Procedure                               Abnormality         Status                     ---------                               -----------         ------                     CBC with Differential[[id number]]       Abnormal            Final result                 Please view results for these tests on the individual orders.     EKG Readings: (Independently Interpreted)   Initial Reading: No STEMI. Rhythm: Normal Sinus Rhythm. Heart Rate: 75. Ectopy: No Ectopy. Conduction: Normal. ST Segments: Normal ST Segments. T Waves: Normal. Axis: Normal. Clinical Impression: Normal Sinus Rhythm   Done at 09:53, low voltage QRS     ECG Results              EKG 12-lead (Final result)        Collection Time Result Time QRS Duration OHS QTC Calculation    05/15/24 09:53:47 05/15/24 13:33:32 76 402                     Final result by Interface, Lab In [facility] (05/15/24 13:33:42)                   Narrative:    Test Reason : R07.9,    Vent. Rate : 075 BPM     Atrial Rate : 075 BPM     P-R Int : 152 ms          QRS Dur : 076 ms      QT Int : 360 ms       P-R-T Axes : 070 064 069 degrees     QTc Int : 402 ms    Normal sinus rhythm  Low voltage QRS  Nonspecific T wave abnormality  Abnormal ECG  No previous ECGs available  Confirmed by Ruddy Hogan MD ([id number]) on 5/15/2024 1:33:31 PM    Referred By: [email or web address]   SELF           Confirmed By:Ruddy Hogan MD                                     EKG 12-lead (Final result)  Result time 05/21/24 13:06:47      Final result by Unknown User (05/21/24 13:06:47)                                      Imaging Results              CTA Chest Non-Coronary (PE Studies) (Final result)  Result time 05/15/24 13:19:15      Final result by Rafael Beck MD (05/15/24 13:19:15)                   Impression:      1.  No pulmonary embolism  identified.    2.  Bilateral lungs dependent hypoventilatory changes and suspected mild infiltrates on the right.    3.  Bilateral minimal pleural effusions.      Electronically signed by: Rafael Beck  Date:    05/15/2024  Time:    13:19               Narrative:    EXAMINATION:  CTA CHEST NON CORONARY (PE STUDIES)    CLINICAL HISTORY:  Pulmonary embolism (PE) suspected, positive D-dimer;    TECHNIQUE:  Sequential axial images performed of the chest using pulmonary embolism protocol following intravenous contrast bolus. Sagittal and contrast reformations performed.    Dose product length of 357 mGycm. Automated exposure control was utilized to minimize radiation dose.    COMPARISON:  Chest radiograph May 15, 2020    FINDINGS:  Optimal contrast bolus timing with adequately opacified pulmonary artery system is without evidence of filling defects from pulmonary thromboembolism within the main pulmonary artery and the major branches.  Thoracic aorta is without aneurysmal dilatation there are no signs of dissection.  Cardiac chambers are mildly enlarged in size.  No pericardial effusion.  No enlarged mediastinal lymph nodes.  There are axillary up to 1.1 cm mildly enlarged lymph nodes.    Bilateral dependent lungs hypoventilatory changes with suspected associated mild infiltrates right lower lung lobe.  Minimal left and small right pleural effusion.  There is no pulmonary edema.    No acute findings of the visualized upper abdomen viscera.  No acute or aggressive skeletal abnormality.                                       X-Ray Chest AP Portable (Final result)  Result time 05/15/24 10:39:23      Final result by Renzo Ledesma MD (05/15/24 10:39:23)                   Impression:      No acute cardiopulmonary process.      Electronically signed by: Renzo Ledesma  Date:    05/15/2024  Time:    10:39               Narrative:    EXAMINATION:  XR CHEST AP PORTABLE    CLINICAL HISTORY:  Chest pain,  unspecified    TECHNIQUE:  Single view of the chest    COMPARISON:  No prior imaging available for comparison.    FINDINGS:  No focal opacification, pleural effusion, or pneumothorax.    The cardiomediastinal silhouette is within normal limits.    No acute osseous abnormality.                                       X-Ray Foot Complete Left (Final result)  Result time 05/15/24 09:35:47      Final result by Renzo Ledesma MD (05/15/24 09:35:47)                   Impression:      No acute osseous abnormality, fracture, or dislocation.    There is no significant degenerative change.      Electronically signed by: Renzo Ledesma  Date:    05/15/2024  Time:    09:35               Narrative:    EXAMINATION:  XR FOOT COMPLETE 3 VIEW LEFT    CLINICAL HISTORY:  Pain in left foot    TECHNIQUE:  Radiographs of the left foot with AP, lateral and oblique  views.    COMPARISON:  No prior imaging available for comparison    FINDINGS:  There is no acute fracture, subluxation or dislocation.    Joints and interspaces appear maintained.    Osseous structures show normal bone mineral density.    Soft tissues are unremarkable.    There are no radiopaque foreign bodies.                                       Medications   morphine injection 4 mg (4 mg Intravenous Given 5/15/24 1019)   ondansetron injection 4 mg (4 mg Intravenous Given 5/15/24 1019)   iohexoL (OMNIPAQUE 350) injection 75 mL (75 mLs Intravenous Given 5/15/24 1307)   morphine injection 4 mg (4 mg Intravenous Given 5/15/24 1530)     Medical Decision Making  Problems Addressed:  Arthralgia, unspecified joint: acute illness or injury that poses a threat to life or bodily functions  Cellulitis, unspecified cellulitis site: acute illness or injury that poses a threat to life or bodily functions  Chest pain: acute illness or injury that poses a threat to life or bodily functions  Edema, unspecified type: acute illness or injury that poses a threat to life or bodily  functions  Left foot pain: acute illness or injury that poses a threat to life or bodily functions  Leg swelling: acute illness or injury that poses a threat to life or bodily functions  Pneumonia of right lower lobe due to infectious organism: acute illness or injury that poses a threat to life or bodily functions  Positive ALIE (antinuclear antibody): acute illness or injury that poses a threat to life or bodily functions    Amount and/or Complexity of Data Reviewed  External Data Reviewed: notes.  Labs: ordered.  Radiology: ordered and independent interpretation performed.  ECG/medicine tests: ordered and independent interpretation performed.    Risk  Prescription drug management.  Parenteral controlled substances.  Decision regarding hospitalization.            Scribe Attestation:   Scribe #1: I performed the above scribed service and the documentation accurately describes the services I performed. I attest to the accuracy of the note.    Attending Attestation:           Physician Attestation for Scribe:  Physician Attestation Statement for Scribe #1: I, Karl Zhu MD, reviewed documentation, as scribed by Sadie Chaney in my presence, and it is both accurate and complete.             ED Course as of 06/05/24 1223   Wed May 15, 2024   1009 Normal sinus rhythm.  Rate of 75.  Low voltage QRS.  No STEMI.  Time of 953 [RP]   1019 Reviewed previous records: Labs showed positive ALIE, elevated ESR, wnl CMP and uric acid level. Advil helps some during the day. Has difficulty sleeping 2/2 pain [RP]   1129 There was no evidence of deep or superficial vein thrombosis in the bilateral lower extremities.      [RP]      ED Course User Index  [RP] Karl Zhu MD               Medical Decision Making:   History:   I obtained history from: someone other than patient.       <> Summary of History: Reviewed old records  Old Medical Records: I decided to obtain old medical records.  Old Records Summarized: records from  clinic visits, records from previous admission(s) and records from another hospital.       <> Summary of Records: Collateral from family  Initial Assessment:   SOB, CP, Leg swelling, toe swelling  Differential Diagnosis:   Judging by the patient's chief complaint and pertinent history, the patient has the following possible differential diagnoses, including but not limited to the following.  Some of these are deemed to be lower likelihood and some more likely based on my physical exam and history combined with possible lab work and/or imaging studies.   Please see the pertinent studies, and refer to the HPI.  Some of these diagnoses will take further evaluation to fully rule out, perhaps as an outpatient and the patient was encouraged to follow up when discharged for more comprehensive evaluation.    ACS, pneumonia, COVID/Flu, congestive heart failure, asthma, COPD, pleural effusion, pulmonary edema, acute bronchitis, PE, pneumothorax, hemothorax, aortic dissection, electrolyte abnormalities, anemia, anxiety, DVT, leg swelling, cellulitis, foreign body,  Independently Interpreted Test(s):   I have ordered and independently interpreted X-rays - see prior notes.  I have ordered and independently interpreted EKG Reading(s) - see prior notes  Clinical Tests:   Lab Tests: Ordered and Reviewed  Radiological Study: Ordered and Reviewed  Medical Tests: Ordered and Reviewed  ED Management:  Patient is a 69-year-old female presents to emergency department for swelling of the left toe and foot that began few days ago.  See HPI.  See physical exam.  No evidence of foreign body visualized.  X-ray without evidence of fracture.  Will continue antibiotics.  Lab work as noted.  EKG without ST elevation.  Patient went to recent travel back from [place], complaining of leg swelling and 12 lb weight gain, ultrasound of lower extremities without evidence of DVT.  CTA without evidence of PE.  Infiltrates noted.  Will cover with  antibiotics. Reassessed patient.  Patient is resting comfortably.  Discussed all results.  Discussed need for follow-up.  Discussed return precautions.  Answered all questions at this time.  Hemodynamically stable for continued outpatient management with strict return precautions.  Patient and family verbalized understanding agreed to plan.               Clinical Impression:  Final diagnoses:  [M79.672] Left foot pain  [M79.89] Leg swelling  [R07.9] Chest pain  [R60.9] Edema, unspecified type  [J18.9] Pneumonia of right lower lobe due to infectious organism (Primary)  [R76.8] Positive ALIE (antinuclear antibody)  [L03.90] Cellulitis, unspecified cellulitis site  [M25.50] Arthralgia, unspecified joint          ED Disposition Condition    Discharge Stable          ED Prescriptions       Medication Sig Dispense Start Date End Date Auth. Provider    ondansetron (ZOFRAN-ODT) 4 MG TbDL () Take 1 tablet (4 mg total) by mouth every 8 (eight) hours as needed. 15 tablet 5/15/2024 2024 Karl Zhu MD    azithromycin (Z-TORIE) 250 MG tablet Take 1 tablet (250 mg total) by mouth once daily. Take first 2 tablets together, then 1 every day until finished. 6 tablet 5/15/2024 -- Karl Zhu MD    amoxicillin-clavulanate 875-125mg (AUGMENTIN) 875-125 mg per tablet Take 1 tablet by mouth 2 (two) times daily. 14 tablet 5/15/2024 -- Karl Zhu MD    furosemide (LASIX) 20 MG tablet Take 1 tablet (20 mg total) by mouth once daily. 30 tablet 5/15/2024 2024 Karl Zhu MD    HYDROcodone-acetaminophen (NORCO) 5-325 mg per tablet () Take 1 tablet by mouth every 8 (eight) hours as needed for Pain. 15 tablet 5/15/2024 2024 Karl Zhu MD          Follow-up Information       Follow up With Specialties Details Why Contact Info    Janeth Tian MD Family Medicine   [address] [place] [id number]  [phone]      Primary Care  Call in 1 day  Please call [phone] for a  primary care provider.    Janine Miller MD Rheumatology   [address]  [address]  [facility] [address]  [phone]      Mathew Manzo MD Rheumatology   [address]  [address]  [facility] [address]  [phone]      Kleber Dawkins MD Rheumatology, [facility]   [id number] [place] [address] [address] Geetha DYKES [address]  [phone]      Christy Lozoya, PA-C Rheumatology   [address]  [address]  [facility] [address]  [phone]               Karl Zhu MD  06/05/24 122

## 2024-05-15 NOTE — DISCHARGE INSTRUCTIONS
Follow-up with your primary care physician in 1-2 days.      Continue taking your meloxicam as prescribed.  You may take Norco for pain.  You may take Zofran as needed for nausea and vomiting.      We are placing you on antibiotics for CT scan showing pneumonia.    Your blood work yesterday showed positive ALIE.  You will need follow-up with rheumatology.  Please see attached rheumatologist on paper.    Return to the emergency department if you have any fever, worsening pain, nausea, vomiting, difficulty breathing, headache, or any other concerns.

## 2024-05-20 LAB
BACTERIA BLD CULT: NORMAL
BACTERIA BLD CULT: NORMAL

## 2024-06-03 ENCOUNTER — TELEPHONE (OUTPATIENT)
Dept: PRIMARY CARE CLINIC | Facility: CLINIC | Age: 69
End: 2024-06-03
Payer: COMMERCIAL

## 2024-06-03 DIAGNOSIS — R76.8 POSITIVE ANA (ANTINUCLEAR ANTIBODY): Primary | ICD-10-CM

## 2024-06-03 DIAGNOSIS — M25.40 SWELLING OF MULTIPLE JOINTS: ICD-10-CM

## 2024-06-03 NOTE — TELEPHONE ENCOUNTER
Pt called and stated she was unable to get an apt with the referral sent to  with Dr Asha Miller. Pt stated she would like a referral to Dr.Supraja Wright for rheumatology.

## 2024-08-16 ENCOUNTER — TELEPHONE (OUTPATIENT)
Dept: RHEUMATOLOGY | Facility: CLINIC | Age: 69
End: 2024-08-16
Payer: COMMERCIAL

## 2024-08-16 NOTE — PROGRESS NOTES
"  Patient ID: [id number]     Chief Complaint: abnormal lab work (Pt stated having pain in ankles  toes, wrist,rt arm,rt toe, rt arm and both knees)      Referred By: Dr. Janeth Tian     HPI:     Leah Pepper is a 69 y.o. female here today for a new patient visit for pain in joints.      Presents today for evaluation of pain in swelling in multiple joints. She was recent seen in [place] by Rheumatology June 2024.  She reports pain started around May of 2024- states after a night of dancing, she had some swelling to her left 2nd toe- didn't think much of it, left for a cruise the following day, went on the cruise and came back and saw [facility]- given antibiotics and prednisone, took as directed- she states within the hour she states every joint in her body was hurting, had swelling in her leg. States hand was swollen up, left shoulder pain was severe. Presented to [facility] due to pain and swelling and ended getting dx with Pneumonia- given meds but did not take as she did not feel that it was Pneumonia, the following day she went to [facility] and admitted x 5 days- had a "whole slew" of test completed and reports all negative (did have a low titer +ALIE but repeat negative). She she was given Cephalexin again to see if she would have the same reaction and states same thing occurred- swelling and severe joint pain all over again. She was enc to see a Rheumatologist, could not get in here in [place] so went to [place]- did a lot of test, states was told her immune system was intact but did have a MRI of her right hand- she states she was told she had a "tear" and was advised to see an Ortho- completed from [facility]. She reports she continues to have pain, mainly to her right hand- has swelling, cannot make a fist, left hand is ok, but pain with movement and swelling to her left wrist. She states she is a very active 69 year old- cleans her own house- was playing soccer until she was 58- so this has got her down. She states the pain " "is "funny", because it will "move", not always in the same place some days in one place then another will be all joints. Joints that have been bothersome have been right hand, left ankle, right elbow, right toes, right shoulder- left shoulder and elbow have been ok but states initially did have left shoulder pain, now moved to right side.  She reports with shoulder pain she is unable to raise her arm.  She denies any difficulty getting up from a seated position, no muscle weakness.  She does report areas have been red/warm and swollen when painful. She does have a history of right wrist fracture in 2021 but this wrist has not been painful, just the hand and fingers. She denies any morning stiffness when waking up. She has taken Prednisone and did help but also had to take Tylenol with it (only a 5 mg dosing), she has been taking Advil- will take 3-4 tab at a time bid to help with pain and "loosen" up and then again after dinner. Pain will improve with movement at times with the exception of her right hand, other joints pain does improve with movement. She does feel when she was on Prednisone pain was much better, when off pain returns.     Rheumatology note from [place]: Joint pain Starting early May while on a [place] cruise in [place] had Lt 2nd toe swelling, redness and pain, then progressed to b/l hand and wrist pain and swelling with functional limitations. Was in ER twice who treated her with ABX and Steroids.  Some improvement on prednisone taper, still with stiffness and swelling int he right hand and lt wrist. Very suggestive for RA. Schedule MRI of Rt hand and wrist in [place] if possible     Denies history of fevers, rashes, photosensitivity, oral or nasal ulcers, h/o MI, stroke, seizures, h/o PE or DVT, Raynaud's phenomenon, uveitis, malignancies.   Family history of autoimmune disease: None  Pregnancies: 2  Miscarriages: None  Smoking:  Quit 1987, start age of 14 years old, hx <1/2 ppd- did quit off and " on for several years during this time, states maybe smoked 5 years total.     Social History     Tobacco Use   Smoking Status Former    Current packs/day: 0.25    Average packs/day: 0.3 packs/day for 10.0 years (2.5 ttl pk-yrs)    Types: Cigarettes   Smokeless Tobacco Never        -------------------------------------    No known health problems        Past Surgical History:   Procedure Laterality Date    COSMETIC SURGERY         Review of patient's allergies indicates:   Allergen Reactions    Cephalexin Other (See Comments)     Swelling and joint pain    Diphenhydramine      Other reaction(s): heart races       Current Outpatient Medications   Medication Instructions    predniSONE (DELTASONE) 5 MG tablet Take 2 tab po qd x 3 days then take 1 tab po qd x 2 days         Social History     Socioeconomic History    Marital status:    Tobacco Use    Smoking status: Former     Current packs/day: 0.25     Average packs/day: 0.3 packs/day for 10.0 years (2.5 ttl pk-yrs)     Types: Cigarettes    Smokeless tobacco: Never   Substance and Sexual Activity    Alcohol use: Yes     Alcohol/week: 3.0 standard drinks of alcohol     Types: 3 Glasses of wine per week     Comment: ocassional    Drug use: Never    Sexual activity: Yes     Partners: Male     Social Determinants of Health     Financial Resource Strain: Low Risk  (5/20/2024)    Received from [facility]sidi[facility] and Affiliates    Overall Financial Resource Strain (CARDIA)     Difficulty of Paying Living Expenses: Not very hard   Food Insecurity: No Food Insecurity (5/20/2024)    Received from [facility] and [facility]    Hunger Vital Sign     Worried About Running Out of Food in the Last Year: Never true     Ran Out of Food in the Last Year: Never true   Transportation Needs: No Transportation Needs (5/12/2024)    PRAPARE - Transportation     Lack of  Transportation (Medical): No     Lack of Transportation (Non-Medical): No   Physical Activity: Unknown (5/12/2024)    Exercise Vital Sign     Days of Exercise per Week: 2 days   Stress: No Stress Concern Present (5/12/2024)    [employer] - Occupational Stress Questionnaire     Feeling of Stress : Not at all   Housing Stability: Unknown (5/12/2024)    Housing Stability Vital Sign     Unable to Pay for Housing in the Last Year: No        Family History   Problem Relation Name Age of Onset    No Known Problems Mother      Heart disease Father Alejandro     Diabetes Father Alejandro     No Known Problems Sister      No Known Problems Brother          Immunization History   Administered Date(s) Administered    COVID-19, MRNA, LN-S, PF (Pfizer) (Purple Cap) 02/04/2021, 02/25/2021, 09/29/2021, 05/12/2022    Pneumococcal Conjugate - 13 Valent 01/29/2021    Zoster Recombinant 01/29/2021, 07/29/2021       Patient Care Team:  Janeth Tian MD as PCP - General (Family Medicine)     Subjective:     ROS    Constitutional:  Denies chills. Denies fever. Denies night sweats. Denies weight loss. Denies sleep disturbance.   Ophthalmology: Denies blurred vision. Denies diminished visual acuity. Denies dry eyes. Denies eye pain. Denies Itching and redness.   ENT: Denies decreased hearing. Denies oral ulcers. Denies epistaxis. Denies swelling of ears or throat. Denies dry mouth. Denies swollen glands. Denies hair loss.   Endocrine: Denies diabetes. Denies thyroid Problems.   Respiratory: Denies cough. Denies shortness of breath. Denies shortness of breath with exertion. Denies hemoptysis.   Cardiovascular: Denies chest pain at rest. Denies chest pain with exertion. Denies Irregular heartbeat. Denies palpitations. Denies edema. Denies orthopnea.   Gastrointestinal: Denies abdominal pain. Denies diarrhea. Denies nausea. Denies vomiting. Denies hematemesis or hematochezia. Denies change in appetite. Denies  "heartburn.  Genitourinary: Denies dysuria. Denies urinary frequency. Denies urinary urgency. Denies blood in urine.  Musculoskeletal: See HPI for details  Integumentary: Denies rash. Denies Itching. Denies dry skin. Denies photosensitivity.   Peripheral Vascular: Denies Ulcers of hands and/or feet. Denies Cold extremities.   Neurologic: Denies dizziness. Denies headache. Denies difficulty speaking. Admits loss of strength to right hand.Denies numbness or tingling.   Psychiatric: Denies depression. Denies anxiety. Denies suicidal/homicidal ideations.    Objective:     Visit Vitals  /78 (BP Location: Right arm, Patient Position: Sitting, BP Method: Medium (Automatic))   Pulse 94   Temp 98.1 °F (36.7 °C) (Oral)   Resp 20   Ht 5' 6" (1.676 m)   Wt 74.2 kg (163 lb 9.6 oz)   SpO2 97%   BMI 26.41 kg/m²       Physical Exam    General Appearance: alert, pleasant, in no acute distress.  Skin: Skin color, texture, turgor normal. No rashes or lesions.  Eyes:  extraocular movement intact (EOMI), pupils equal, round, reactive to light and accommodation, conjunctiva clear.  ENT: No oral or nasal ulcers.  Neck:  Neck supple. No adenopathy.   Lungs: CTA bilaterally without crackles, rhonchi, or wheezes.   Heart: RRR w/o murmurs.  No edema. 2+ DP pulse.  Abdomen: Soft, non-tender, no masses, rebound or guarding.  Neuro: Alert, oriented, CN II-XII GI, sensory and motor innervation intact.  Musculoskeletal:  Right hand puffy, tenderness to 2nd and 3rd MCP, unable to fully make fist, left hand, no tenderness to MCPs/PIPs/DIPs, right wrist with no pain or tenderness, left wrist with some swelling, squaring of CMC, mild tenderness noted to left wrist with ROM, no pain to bilateral elbows, mild tenderness with ROM right shoulder, left shoulder okay, no pain or tenderness noted to bilateral knees, mild tenderness to bilateral ankles, left MTPs with no pain, mild swelling and erythema noted to 2nd left toe, mild tenderness to 4th right " "MTP with some swelling noted, otherwise no pain, -SLE and ANTONIO bilaterally, full range of motion noted to spine and hips, 5/5 strength neck flexion, 5/5 strength noted to bilateral proximal upper and lower extremities  Psych: Alert, oriented, normal eye contact.    Labs Reviewed:   2024 ALIE 1.3 (<1), RF/CCP negative, elevated CRP at 100    2024 immunoglobulins IgE elevated at 357 (0-100),    2024: ALIE negative, HLA B27 negative, C3/C4 okay, TSH okay, TPO negative, free T3 and T4 okay, uric acid 5.6, RF <5 (< 14), CCP less than 8 (<16.99), CRP 2.4 (<0.5), ESR 44 (<30), hep B/C/A all negative, urine with no blood or protein noted    Rheumatology:  Lab Results   Component Value Date    INTER SEE COMMENTS 05/10/2024    SEDRATE 9 2024    LABURIC 6.3 (H) 2024        Chemistry:  Lab Results   Component Value Date     2024    K 4.2 2024    BUN 21.5 (H) 2024    CREATININE 0.86 2024    EGFRNORACEVR >60 2024    GLUCOSE 83 2024    CALCIUM 10.4 (H) 2024    ALKPHOS 66 2024    LABPROT 7.4 2024    LABPROT 7.0 2024    LABPROT 13.7 2024    ALBUMIN 4.0 2024    ALBUMIN 3.6 2024    BILIDIR 0.3 02/15/2022    IBILI 0.40 02/15/2022    AST 17 2024    ALT 20 2024        Hematology:  Lab Results   Component Value Date    WBC 7.36 2024    HGB 12.9 2024    HCT 38.0 2024     2024        Urine:  Lab Results   Component Value Date    APPEARANCEUA Clear 05/15/2024    SGUA 1.027 05/15/2024    PROTEINUA Negative 05/15/2024    KETONESUA Negative 05/15/2024    LEUKOCYTESUR 75 (A) 05/15/2024    RBCUA 0-5 05/15/2024    WBCUA 6-10 (A) 05/15/2024    BACTERIA None Seen 05/15/2024    SQEPUA None Seen 05/15/2024        No results found for: "PROTEINURINE", "CREATRANDUR", "UPROTCREA"     Imagin2024 CXRay: Impression: No acute disease or significant cardiopulmonary abnormality.     2024 " Bilateral Hand Xrays; IMPRESSION: Osteopenia and mild degenerative changes in bilateral hands and STT joints.   Bilateral Ankle Xray: IMPRESSION: Mild to moderate degenerative changes in bilateral feet.  Left lateral ankle joint soft tissue swelling and tiny lateral malleolar osteophyte also on the left. No significant degenerative changes in the right ankle.     08/02/2024 MRI right hand/fingers without contrast:  Longitudinal tear of the flexor digitorum superficialis and profundus of the 3rd digit beginning near the head was 3rd metacarpal distally to the midportion of the middle phalanx.     Assessment:       ICD-10-CM ICD-9-CM   1. Swelling of multiple joints  M25.40 719.09   2. Inflammatory arthritis  M19.90 714.9   3. Bilateral foot pain  M79.671 729.5    M79.672    4. Bilateral hand pain  M79.641 729.5    M79.642    5. Acute pain of both shoulders  M25.511 719.41    M25.512         Plan:     1. Swelling of multiple joints/Inflammatory arthritis  Presents today for evaluation of ongoing joint pain with swelling. Symptoms started in May 2024 with swelling to her left 2nd toe- reports went to [facility] for evaluation and was treated with antibiotics- states after taking does, severe pain to all joints, ended up presenting to the ER x2, 2nd visit she was admitted x5 days at [facility].  She had several tests complete, initially ALIE drawn noted be very low titer positive, later she was referred to Rheumatology and was seen in [place], repeat lab of ALIE noted to be negative along with negative HLA B27 and CCP.  ESR noted at 44 (<30), CRP 2.4 (<0.5).  Uric acid 5.3.  After her visit with Rheumatology in [place], was noted that there were some concerns for inflammatory arthritis and started on Prednisone 5 mg po qd at the time.  She also had an MRI completed of her right hand and noted with longitudinal tear of the flexor digitorum superficialis and profundus over the 3rd digit, she was advised to follow-up with Ortho in which  she plans to call and schedule an appointment for eval.   She reports she has been taking prednisone in which this has helped pain, also taking Tylenol and Ibuprofen daily to help with pain.  Today on exam,  right hand puffy, tenderness to 2nd and 3rd MCP, unable to fully make fist,  left wrist with some swelling, squaring of CMC, mild tenderness noted to left wrist with ROM, mild tenderness with ROM right shoulder, mild tenderness to bilateral ankles, mild swelling and erythema noted to 2nd left toe, mild tenderness to 4th right MTP with some swelling noted.  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 q.day, however we will consider possible Sulfasalazine versus MTX in the future.  - Cxray May 2024 ok.   - F/U OV in 4 weeks (will be in on a cruise around this time, will f/u when she returns in 6 weeks)     2. Bilateral foot/Hand Pain  6/19/2024 Bilateral Hand Xrays; IMPRESSION: Osteopenia and mild degenerative changes in bilateral hands and STT joints.   Bilateral Ankle Xray: IMPRESSION: Mild to moderate degenerative changes in bilateral feet.  Left lateral ankle joint soft tissue swelling and tiny lateral malleolar osteophyte also on the left. No significant degenerative changes in the right ankle.   -08/02/2024 MRI right hand/fingers without contrast:  Longitudinal tear of the flexor digitorum superficialis and profundus of the 3rd digit beginning near the head was 3rd metacarpal distally to the midportion of the middle phalanx.   - Plans to call Ortho for follow up of right hand MRI for further eval     3. Acute pain of both shoulders  - Xrays today for eval, possible PT in the future      Follow up in about 6 weeks (around 9/30/2024) for NP Follow Up. In addition to their scheduled follow up, the patient has also been instructed to follow up on as needed basis.       Total time  spent with patient and documentation is 75 minutes. All questions were answered to patient's satisfaction and patient verbalized understanding.  This includes face to face time and non-face to face time preparing to see the patient (eg, review of tests), obtaining and/or reviewing separately obtained history, documenting clinical information in the electronic or other health record, independently interpreting results and communicating results to the patient/family/caregiver, or care coordinator.       MARGARET Cox        Orders Placed This Encounter   Procedures    X-Ray Foot Complete Left     Standing Status:   Future     Number of Occurrences:   1     Standing Expiration Date:   8/19/2025     Order Specific Question:   May the Radiologist modify the order per protocol to meet the clinical needs of the patient?     Answer:   Yes     Order Specific Question:   Release to patient     Answer:   Immediate    X-Ray Foot Complete Right     Standing Status:   Future     Number of Occurrences:   1     Standing Expiration Date:   8/19/2025     Order Specific Question:   May the Radiologist modify the order per protocol to meet the clinical needs of the patient?     Answer:   Yes     Order Specific Question:   Release to patient     Answer:   Immediate    X-Ray Shoulder 2 or More Views Left     Standing Status:   Future     Number of Occurrences:   1     Standing Expiration Date:   8/19/2025     Order Specific Question:   May the Radiologist modify the order per protocol to meet the clinical needs of the patient?     Answer:   Yes     Order Specific Question:   Release to patient     Answer:   Immediate    X-ray Shoulder 2 or More Views Right     Standing Status:   Future     Number of Occurrences:   1     Standing Expiration Date:   8/19/2025     Order Specific Question:   May the Radiologist modify the order per protocol to meet the clinical needs of the patient?     Answer:   Yes     Order Specific  Question:   Release to patient     Answer:   Immediate    CBC Auto Differential     Standing Status:   Future     Number of Occurrences:   1     Standing Expiration Date:   10/18/2025    Comprehensive Metabolic Panel     Standing Status:   Future     Number of Occurrences:   1     Standing Expiration Date:   10/18/2025    C-Reactive Protein     Standing Status:   Future     Number of Occurrences:   1     Standing Expiration Date:   10/18/2025    Sedimentation rate     Standing Status:   Future     Number of Occurrences:   1     Standing Expiration Date:   10/18/2025    Uric Acid     Standing Status:   Future     Number of Occurrences:   1     Standing Expiration Date:   10/19/2025    Immunofixation & Protein Electrophoresis & Immunoglobulins     Standing Status:   Future     Number of Occurrences:   1     Standing Expiration Date:   10/19/2025    HIV 1/2 Ag/Ab (4th Gen)     Standing Status:   Future     Number of Occurrences:   1     Standing Expiration Date:   10/18/2025     Order Specific Question:   Release to patient     Answer:   Immediate    Quantiferon Gold TB     Standing Status:   Future     Number of Occurrences:   1     Standing Expiration Date:   10/18/2025

## 2024-08-16 NOTE — TELEPHONE ENCOUNTER
I attempt to call pt no answer left a voicemail message to call clinic back. Regarding if she still see rheumatology in [place] ?

## 2024-08-19 ENCOUNTER — OFFICE VISIT (OUTPATIENT)
Dept: RHEUMATOLOGY | Facility: CLINIC | Age: 69
End: 2024-08-19
Payer: COMMERCIAL

## 2024-08-19 ENCOUNTER — HOSPITAL ENCOUNTER (OUTPATIENT)
Dept: RADIOLOGY | Facility: HOSPITAL | Age: 69
Discharge: HOME | End: 2024-08-19
Attending: NURSE PRACTITIONER
Payer: COMMERCIAL

## 2024-08-19 VITALS
TEMPERATURE: 98 F | SYSTOLIC BLOOD PRESSURE: 122 MMHG | RESPIRATION RATE: 20 BRPM | HEIGHT: 66 IN | HEART RATE: 94 BPM | BODY MASS INDEX: 26.3 KG/M2 | DIASTOLIC BLOOD PRESSURE: 78 MMHG | OXYGEN SATURATION: 97 % | WEIGHT: 163.63 LBS

## 2024-08-19 DIAGNOSIS — M79.641 BILATERAL HAND PAIN: ICD-10-CM

## 2024-08-19 DIAGNOSIS — M25.511 ACUTE PAIN OF BOTH SHOULDERS: ICD-10-CM

## 2024-08-19 DIAGNOSIS — M79.671 BILATERAL FOOT PAIN: ICD-10-CM

## 2024-08-19 DIAGNOSIS — M79.672 BILATERAL FOOT PAIN: ICD-10-CM

## 2024-08-19 DIAGNOSIS — M79.642 BILATERAL HAND PAIN: ICD-10-CM

## 2024-08-19 DIAGNOSIS — M25.512 ACUTE PAIN OF BOTH SHOULDERS: ICD-10-CM

## 2024-08-19 DIAGNOSIS — M25.40 SWELLING OF MULTIPLE JOINTS: Primary | ICD-10-CM

## 2024-08-19 DIAGNOSIS — M19.90 INFLAMMATORY ARTHRITIS: ICD-10-CM

## 2024-08-19 PROBLEM — R76.8 ELEVATED IGE LEVEL: Status: ACTIVE | Noted: 2024-05-24

## 2024-08-19 PROCEDURE — 73630 X-RAY EXAM OF FOOT: CPT | Mod: TC,RT

## 2024-08-19 PROCEDURE — 3288F FALL RISK ASSESSMENT DOCD: CPT | Mod: CPTII,,, | Performed by: NURSE PRACTITIONER

## 2024-08-19 PROCEDURE — 1160F RVW MEDS BY RX/DR IN RCRD: CPT | Mod: CPTII,,, | Performed by: NURSE PRACTITIONER

## 2024-08-19 PROCEDURE — 1159F MED LIST DOCD IN RCRD: CPT | Mod: CPTII,,, | Performed by: NURSE PRACTITIONER

## 2024-08-19 PROCEDURE — 1101F PT FALLS ASSESS-DOCD LE1/YR: CPT | Mod: CPTII,,, | Performed by: NURSE PRACTITIONER

## 2024-08-19 PROCEDURE — 73630 X-RAY EXAM OF FOOT: CPT | Mod: TC,LT

## 2024-08-19 PROCEDURE — 1125F AMNT PAIN NOTED PAIN PRSNT: CPT | Mod: CPTII,,, | Performed by: NURSE PRACTITIONER

## 2024-08-19 PROCEDURE — 3078F DIAST BP <80 MM HG: CPT | Mod: CPTII,,, | Performed by: NURSE PRACTITIONER

## 2024-08-19 PROCEDURE — 73030 X-RAY EXAM OF SHOULDER: CPT | Mod: TC,RT

## 2024-08-19 PROCEDURE — 3074F SYST BP LT 130 MM HG: CPT | Mod: CPTII,,, | Performed by: NURSE PRACTITIONER

## 2024-08-19 PROCEDURE — 99215 OFFICE O/P EST HI 40 MIN: CPT | Mod: PBBFAC,25 | Performed by: NURSE PRACTITIONER

## 2024-08-19 PROCEDURE — 3008F BODY MASS INDEX DOCD: CPT | Mod: CPTII,,, | Performed by: NURSE PRACTITIONER

## 2024-08-19 PROCEDURE — 73030 X-RAY EXAM OF SHOULDER: CPT | Mod: TC,LT

## 2024-08-19 PROCEDURE — 99205 OFFICE O/P NEW HI 60 MIN: CPT | Mod: S$PBB,,, | Performed by: NURSE PRACTITIONER

## 2024-08-19 RX ORDER — PREDNISONE 5 MG/1
TABLET ORAL
Qty: 30 TABLET | Refills: 1 | Status: SHIPPED | OUTPATIENT
Start: 2024-08-19

## 2024-08-19 NOTE — TELEPHONE ENCOUNTER
CLARIFICATION SPOKE TO PT NO LONGER SEEING RHEUMATOLOGY IN [facility]. SHE WILL KEEP HER APT TODAY

## 2024-08-19 NOTE — TELEPHONE ENCOUNTER
SPOKE WITH PT ASKED IF SHE IS SEEING RHEUMATOLOGY IN [place]. AND SHE KEEPING HER APT ON TODAY. ,[place] PT STATED SHE IS NOT

## 2024-08-23 ENCOUNTER — TELEPHONE (OUTPATIENT)
Dept: RHEUMATOLOGY | Facility: CLINIC | Age: 69
End: 2024-08-23
Payer: COMMERCIAL

## 2024-08-23 DIAGNOSIS — D84.821 DRUG-INDUCED IMMUNODEFICIENCY: ICD-10-CM

## 2024-08-23 DIAGNOSIS — Z79.899 HIGH RISK MEDICATION USE: ICD-10-CM

## 2024-08-23 DIAGNOSIS — Z79.899 DRUG-INDUCED IMMUNODEFICIENCY: ICD-10-CM

## 2024-08-23 DIAGNOSIS — M12.39 PALINDROMIC RHEUMATISM INVOLVING MULTIPLE SITES: Primary | ICD-10-CM

## 2024-08-23 RX ORDER — SULFASALAZINE 500 MG/1
TABLET ORAL
Qty: 120 TABLET | Refills: 1 | Status: SHIPPED | OUTPATIENT
Start: 2024-08-23

## 2024-08-23 NOTE — TELEPHONE ENCOUNTER
----- Message from Pippa Kebede sent at 8/23/2024  9:44 AM CDT -----  Pt called to get result for her x-ray and blood work . She would like a callback     Callback [phone]

## 2024-08-23 NOTE — TELEPHONE ENCOUNTER
Patient stated her hands, feet, forearms are hurting really bad, feel like they have been beaten and swollen. Please advise on results and patient complaint. Thank you.

## 2024-08-26 ENCOUNTER — TELEPHONE (OUTPATIENT)
Dept: RHEUMATOLOGY | Facility: CLINIC | Age: 69
End: 2024-08-26
Payer: COMMERCIAL

## 2024-08-26 NOTE — TELEPHONE ENCOUNTER
Informed patient per Malena TORRE to follow up with PCP for testing. Patient stated understanding.

## 2024-08-26 NOTE — TELEPHONE ENCOUNTER
Patient requesting to be tested for European lime disease. Patient stated has a friend who had the same symptoms as her. Stated [place] lime disease. Please advise. Thank you.

## 2024-08-26 NOTE — TELEPHONE ENCOUNTER
----- Message from Lexie Vazuqez sent at 8/26/2024  9:56 AM CDT -----  Pt called requesting a call back from Malena or a nurse, pt can be reached @ [phone]

## 2024-08-30 ENCOUNTER — TELEPHONE (OUTPATIENT)
Dept: RHEUMATOLOGY | Facility: CLINIC | Age: 69
End: 2024-08-30
Payer: COMMERCIAL

## 2024-08-30 NOTE — TELEPHONE ENCOUNTER
SPOKE TO PT INFORMED PT THAT  THAT SHE WOULD HAVE TO CALL HER PCP FOR INFECTIONS DISEASE. PT VERBALIZED UNDERSTANDING               ----- Message from Pippa Kebede sent at 8/28/2024  9:56 AM CDT -----  Regarding: Referal Request  Pt is calling to speak to someone about a referral to see infections digna    Callback [phone]

## 2024-09-05 ENCOUNTER — OFFICE VISIT (OUTPATIENT)
Dept: PRIMARY CARE CLINIC | Facility: CLINIC | Age: 69
End: 2024-09-05
Payer: COMMERCIAL

## 2024-09-05 VITALS
HEART RATE: 76 BPM | BODY MASS INDEX: 26.25 KG/M2 | DIASTOLIC BLOOD PRESSURE: 86 MMHG | OXYGEN SATURATION: 97 % | HEIGHT: 66 IN | WEIGHT: 163.31 LBS | SYSTOLIC BLOOD PRESSURE: 138 MMHG

## 2024-09-05 DIAGNOSIS — M25.511 ACUTE PAIN OF BOTH SHOULDERS: ICD-10-CM

## 2024-09-05 DIAGNOSIS — M79.671 BILATERAL FOOT PAIN: ICD-10-CM

## 2024-09-05 DIAGNOSIS — M79.642 BILATERAL HAND PAIN: ICD-10-CM

## 2024-09-05 DIAGNOSIS — M25.40 SWELLING OF MULTIPLE JOINTS: Primary | ICD-10-CM

## 2024-09-05 DIAGNOSIS — M25.512 ACUTE PAIN OF BOTH SHOULDERS: ICD-10-CM

## 2024-09-05 DIAGNOSIS — M79.672 BILATERAL FOOT PAIN: ICD-10-CM

## 2024-09-05 DIAGNOSIS — M79.641 BILATERAL HAND PAIN: ICD-10-CM

## 2024-09-05 PROCEDURE — 3075F SYST BP GE 130 - 139MM HG: CPT | Mod: CPTII,,, | Performed by: STUDENT IN AN ORGANIZED HEALTH CARE EDUCATION/TRAINING PROGRAM

## 2024-09-05 PROCEDURE — 1125F AMNT PAIN NOTED PAIN PRSNT: CPT | Mod: CPTII,,, | Performed by: STUDENT IN AN ORGANIZED HEALTH CARE EDUCATION/TRAINING PROGRAM

## 2024-09-05 PROCEDURE — 3079F DIAST BP 80-89 MM HG: CPT | Mod: CPTII,,, | Performed by: STUDENT IN AN ORGANIZED HEALTH CARE EDUCATION/TRAINING PROGRAM

## 2024-09-05 PROCEDURE — 3288F FALL RISK ASSESSMENT DOCD: CPT | Mod: CPTII,,, | Performed by: STUDENT IN AN ORGANIZED HEALTH CARE EDUCATION/TRAINING PROGRAM

## 2024-09-05 PROCEDURE — 1101F PT FALLS ASSESS-DOCD LE1/YR: CPT | Mod: CPTII,,, | Performed by: STUDENT IN AN ORGANIZED HEALTH CARE EDUCATION/TRAINING PROGRAM

## 2024-09-05 PROCEDURE — 1160F RVW MEDS BY RX/DR IN RCRD: CPT | Mod: CPTII,,, | Performed by: STUDENT IN AN ORGANIZED HEALTH CARE EDUCATION/TRAINING PROGRAM

## 2024-09-05 PROCEDURE — 3008F BODY MASS INDEX DOCD: CPT | Mod: CPTII,,, | Performed by: STUDENT IN AN ORGANIZED HEALTH CARE EDUCATION/TRAINING PROGRAM

## 2024-09-05 PROCEDURE — 1159F MED LIST DOCD IN RCRD: CPT | Mod: CPTII,,, | Performed by: STUDENT IN AN ORGANIZED HEALTH CARE EDUCATION/TRAINING PROGRAM

## 2024-09-05 PROCEDURE — 99214 OFFICE O/P EST MOD 30 MIN: CPT | Mod: ,,, | Performed by: STUDENT IN AN ORGANIZED HEALTH CARE EDUCATION/TRAINING PROGRAM

## 2024-09-05 RX ORDER — PREDNISONE 5 MG/1
TABLET ORAL
Qty: 30 TABLET | Refills: 1 | Status: SHIPPED | OUTPATIENT
Start: 2024-09-05

## 2024-09-05 NOTE — PROGRESS NOTES
Date: 09/05/2024  Patient ID: [id number]   Chief Complaint: Referral (Referral for infectious disease dr andres )    HPI:   Leah Pepper is a 69 y.o. female here today for Referral (Referral for infectious disease dr andres )  She has been to rheumatology for multiple joint pain and swelling and had hospitalizations for L leg cellulitis. She's had multiple tests and imaging done that were wnl. She was prescribed Prednisone and Sulfasalazine with no improvement. She thinks she had a bug bite during her river cruise in [place], and her symptoms started. So she would like referral to ID for possible  Lyme disease testing. She denies h/a or mental fogginess. She has another trip planned next week and would like refill of Prednisone.     Patient Active Problem List   Diagnosis    Swelling of multiple joints    Positive ALIE (antinuclear antibody)    Elevated IgE level     Outpatient Medications Marked as Taking for the 9/5/24 encounter (Office Visit) with Janeth Tian MD   Medication Sig Dispense Refill    sulfaSALAzine (AZULFIDINE) 500 mg Tab Take 1 tab (500 mg) p.o. b.i.d. x1 week and then increase dose to 2 tabs (1000 mg) p.o. b.i.d. 120 tablet 1    [DISCONTINUED] predniSONE (DELTASONE) 5 MG tablet Take 2 tab po qd x 3 days then take 1 tab po qd x 2 days 30 tablet 1     Past Medical History:   Diagnosis Date    No known health problems      Past Surgical History:   Procedure Laterality Date    COSMETIC SURGERY       Review of patient's allergies indicates:   Allergen Reactions    Cephalexin Other (See Comments)     Swelling and joint pain    Diphenhydramine      Other reaction(s): heart races     Family History   Problem Relation Name Age of Onset    No Known Problems Mother      Heart disease Father Alejandro     Diabetes Father Alejandro     No Known Problems Sister      No Known Problems Brother        Social History     Socioeconomic History    Marital status:    Tobacco Use    Smoking status: Former  "    Current packs/day: 0.25     Average packs/day: 0.3 packs/day for 10.0 years (2.5 ttl pk-yrs)     Types: Cigarettes    Smokeless tobacco: Never   Substance and Sexual Activity    Alcohol use: Yes     Alcohol/week: 3.0 standard drinks of alcohol     Types: 3 Glasses of wine per week     Comment: ocassional    Drug use: Never    Sexual activity: Yes     Partners: Male     Social Determinants of Health     Financial Resource Strain: Low Risk  (5/20/2024)    [facility] [facility] and [facility] Subsidiaries and Affiliates    Overall Financial Resource Strain (CARDIA)     Difficulty of Paying Living Expenses: Not very hard   Food Insecurity: No Food Insecurity (5/20/2024)    [facility] [facility]    Hunger Vital Sign     Worried About Running Out of Food in the Last Year: Never true     Ran Out of Food in the Last Year: Never true   Transportation Needs: No Transportation Needs (5/12/2024)    PRAPARE - Transportation     Lack of Transportation (Medical): No     Lack of Transportation (Non-Medical): No   Physical Activity: Unknown (5/12/2024)    Exercise Vital Sign     Days of Exercise per Week: 2 days   Stress: No Stress Concern Present (5/12/2024)    [employer] - Occupational Stress Questionnaire     Feeling of Stress : Not at all   Housing Stability: Unknown (5/12/2024)    Housing Stability Vital Sign     Unable to Pay for Housing in the Last Year: No     Patient Care Team:  Janeth Tian MD as PCP - General (Family Medicine)   Subjective:   ROS  See HPI for details  All Other ROS: Negative except as stated in HPI.   Objective:   /86   Pulse 76   Ht 5' 6" (1.676 m)   Wt 74.1 kg (163 lb 4.8 oz)   SpO2 97%   BMI 26.36 kg/m²   Physical Exam  Assessment:       ICD-10-CM ICD-9-CM   1. Swelling of multiple joints  M25.40 719.09   2. Bilateral foot pain  M79.671 729.5 "    M79.672    3. Bilateral hand pain  M79.641 729.5    M79.642    4. Acute pain of both shoulders  M25.511 719.41    M25.512       Plan:   1. Swelling of multiple joints  Assessment & Plan:  Refer to ID  Prednisone rx with taper per pt request    Orders:  -     Ambulatory referral/consult to Infectious Disease; Future; Expected date: 09/12/2024  -     predniSONE (DELTASONE) 5 MG tablet; Take 2 tab po qd x 3 days then take 1 tab po qd x 2 days  Dispense: 30 tablet; Refill: 1    2. Bilateral foot pain  -     predniSONE (DELTASONE) 5 MG tablet; Take 2 tab po qd x 3 days then take 1 tab po qd x 2 days  Dispense: 30 tablet; Refill: 1    3. Bilateral hand pain  -     predniSONE (DELTASONE) 5 MG tablet; Take 2 tab po qd x 3 days then take 1 tab po qd x 2 days  Dispense: 30 tablet; Refill: 1    4. Acute pain of both shoulders  -     predniSONE (DELTASONE) 5 MG tablet; Take 2 tab po qd x 3 days then take 1 tab po qd x 2 days  Dispense: 30 tablet; Refill: 1         Medication List with Changes/Refills   Current Medications    SULFASALAZINE (AZULFIDINE) 500 MG TAB    Take 1 tab (500 mg) p.o. b.i.d. x1 week and then increase dose to 2 tabs (1000 mg) p.o. b.i.d.       Start Date: 8/23/2024 End Date: --   Changed and/or Refilled Medications    Modified Medication Previous Medication    PREDNISONE (DELTASONE) 5 MG TABLET predniSONE (DELTASONE) 5 MG tablet       Take 2 tab po qd x 3 days then take 1 tab po qd x 2 days    Take 2 tab po qd x 3 days then take 1 tab po qd x 2 days       Start Date: 9/5/2024  End Date: --    Start Date: 8/19/2024 End Date: 9/5/2024        Follow up if symptoms worsen or fail to improve. In addition to their scheduled follow up, the patient has also been instructed to follow up on as needed basis.

## 2024-10-03 NOTE — PROGRESS NOTES
"  Patient ID: [id number]     Chief Complaint: Swelling of multiple joints (Pt stated having wrists and ankles)      HPI:     Leah Pepper is a 69 y.o. female here today for a follow up visit for Inflammatory Arthritis, ?Palindromic RA.      Presents for follow up of pain in swelling in multiple joints. She was recent seen in [place] by Rheumatology June 2024.  She reports pain started around May of 2024- states after a night of dancing, she had some swelling to her left 2nd toe- didn't think much of it, left for a cruise the following day, went on the cruise and came back and saw [facility]- given antibiotics and prednisone, took as directed- she states within the hour she states every joint in her body was hurting, had swelling in her leg. States hand was swollen up, left shoulder pain was severe. Presented to [facility] due to pain and swelling and ended getting dx with Pneumonia- given meds but did not take as she did not feel that it was Pneumonia, the following day she went to [facility] and admitted x 5 days- had a "whole slew" of test completed and reports all negative (did have a low titer +ALIE but repeat negative). She she was given Cephalexin again to see if she would have the same reaction and states same thing occurred- swelling and severe joint pain all over again. She was enc to see a Rheumatologist, could not get in here in [place] so went to [place]- did a lot of test, states was told her immune system was intact but did have a MRI of her right hand- she states she was told she had a "tear" and was advised to see an Ortho- [facility]. She reports she continues to have pain, mainly to her right hand- has swelling, cannot make a fist, left hand is ok, but pain with movement and swelling to her left wrist. She states she is a very active 69 year old- cleans her own house- was playing soccer until she was 58- so this has got her down. She states the pain is "funny", because it will "move", not always in the " "same place some days in one place then another will be all joints. Joints that have been bothersome have been right hand, left ankle, right elbow, right toes, right shoulder- left shoulder and elbow have been ok but states initially did have left shoulder pain, now moved to right side.  She reports with shoulder pain she is unable to raise her arm.  She denies any difficulty getting up from a seated position, no muscle weakness.  She does report areas have been red/warm and swollen when painful. She does have a history of right wrist fracture in 2021 but this wrist has not been painful, just the hand and fingers. She denies any morning stiffness when waking up. She has taken Prednisone and did help but also had to take Tylenol with it (only a 5 mg dosing), she has been taking Advil- will take 3-4 tab at a time bid to help with pain and "loosen" up and then again after dinner. Pain will improve with movement at times with the exception of her right hand, other joints pain does improve with movement. She does feel when she was on Prednisone pain was much better, when off pain returns.     Rheumatology note from [place]: Joint pain Starting early May while on a [place] cruise in [place] had Lt 2nd toe swelling, redness and pain, then progressed to b/l hand and wrist pain and swelling with functional limitations. Was in ER twice who treated her with ABX and Steroids.  Some improvement on prednisone taper, still with stiffness and swelling int he right hand and lt wrist. Very suggestive for RA. Schedule MRI of Rt hand and wrist in [place] if possible     October 2024: Here today for follow up. She has been off of her Sulfasalazine now for the past 3 days, she reports she took x 1 month and states did not want to be on antibiotics anymore as she had been meds since May 2024. She was unclear it is not an antibiotic- reviewed in detail as to how and what the medication is used for. She has continued  to have joint pain and " "swelling- mainly to her hands, shoulders, left elbow was bothersome recently then resolved, she continues to have right 2nd toe pain and occ ankle pain that will come and go. She denies any rashes, no fever, swollen lymph nodes or fatigue She denies any red/warm joints. Moving when joint pain occurs does help pain- states will "work it's way out, but not totally" but pain does improve with movement- tries to keep active. She has also been off of the Prednisone now for the past 3 weeks. She will use Tylenol or Motrin when needed- helps take the edge off- finds Advil works the best.     Denies any recent illness or hospitalizations since last visit.     Denies history of fevers, rashes, photosensitivity, oral or nasal ulcers, h/o MI, stroke, seizures, h/o PE or DVT, Raynaud's phenomenon, uveitis, malignancies.   Family history of autoimmune disease: None  Pregnancies: 2  Miscarriages: None  Smoking:  Quit 1987, start age of 14 years old, hx <1/2 ppd- did quit off and on for several years during this time, states maybe smoked 5 years total.     Social History     Tobacco Use   Smoking Status Former    Current packs/day: 0.25    Average packs/day: 0.3 packs/day for 10.0 years (2.5 ttl pk-yrs)    Types: Cigarettes   Smokeless Tobacco Never        -------------------------------------    No known health problems        Past Surgical History:   Procedure Laterality Date    COSMETIC SURGERY         Review of patient's allergies indicates:   Allergen Reactions    Cephalexin Other (See Comments)     Swelling and joint pain    Diphenhydramine      Other reaction(s): heart races       Current Outpatient Medications   Medication Instructions    folic acid (FOLVITE) 1 mg, Oral, Daily    methotrexate 2.5 MG Tab Take 4 tablets once a week for 2 weeks and increase dose to 6 tablets once a week. Hold for infection or fever.    predniSONE (DELTASONE) 5 MG tablet Take 2 tab po qd x 3 days then take 1 tab po qd x 2 days         Social " History     Socioeconomic History    Marital status:    Tobacco Use    Smoking status: Former     Current packs/day: 0.25     Average packs/day: 0.3 packs/day for 10.0 years (2.5 ttl pk-yrs)     Types: Cigarettes    Smokeless tobacco: Never   Substance and Sexual Activity    Alcohol use: Yes     Alcohol/week: 3.0 standard drinks of alcohol     Types: 3 Glasses of wine per week     Comment: ocassional    Drug use: Never    Sexual activity: Yes     Partners: Male     Social Drivers of Health     Financial Resource Strain: Low Risk  (5/20/2024)    Received from [facility] and [facility]    Overall Financial Resource Strain (CARDIA)     Difficulty of Paying Living Expenses: Not very hard   Food Insecurity: No Food Insecurity (5/20/2024)    [facility]    Hunger Vital Sign     Worried About Running Out of Food in the Last Year: Never true     Ran Out of Food in the Last Year: Never true   Transportation Needs: No Transportation Needs (5/12/2024)    PRAPARE - Transportation     Lack of Transportation (Medical): No     Lack of Transportation (Non-Medical): No   Physical Activity: Unknown (5/12/2024)    Exercise Vital Sign     Days of Exercise per Week: 2 days   Stress: No Stress Concern Present (5/12/2024)    [employer] - Occupational Stress Questionnaire     Feeling of Stress : Not at all   Housing Stability: Unknown (5/12/2024)    Housing Stability Vital Sign     Unable to Pay for Housing in the Last Year: No        Family History   Problem Relation Name Age of Onset    No Known Problems Mother      Heart disease Father Alejandro     Diabetes Father Alejandro     No Known Problems Sister      No Known Problems Brother          Immunization History   Administered Date(s) Administered    COVID-19, MRNA, LN-S, PF (Pfizer) (Purple Cap) 02/04/2021,  "02/25/2021, 09/29/2021, 05/12/2022    Pneumococcal Conjugate - 13 Valent 01/29/2021    Zoster Recombinant 01/29/2021, 07/29/2021       Patient Care Team:  Janeth Tian MD as PCP - General (Family Medicine)     Subjective:     ROS    Constitutional:  Denies chills. Denies fever. Denies night sweats. Denies weight loss. Denies sleep disturbance.   Ophthalmology: Denies blurred vision. Denies diminished visual acuity. Denies dry eyes. Denies eye pain. Denies Itching and redness.   ENT: Denies decreased hearing. Denies oral ulcers. Denies epistaxis. Denies swelling of ears or throat. Denies dry mouth. Denies swollen glands. Denies hair loss.   Endocrine: Denies diabetes. Denies thyroid Problems.   Respiratory: Denies cough. Denies shortness of breath. Denies shortness of breath with exertion. Denies hemoptysis.   Cardiovascular: Denies chest pain at rest. Denies chest pain with exertion. Denies Irregular heartbeat. Denies palpitations. Denies edema. Denies orthopnea.   Gastrointestinal: Denies abdominal pain. Denies diarrhea. Denies nausea. Denies vomiting. Denies hematemesis or hematochezia. Denies change in appetite. Denies heartburn.  Genitourinary: Denies dysuria. Denies urinary frequency. Denies urinary urgency. Denies blood in urine.  Musculoskeletal: See HPI for details  Integumentary: Denies rash. Denies Itching. Denies dry skin. Denies photosensitivity.   Peripheral Vascular: Denies Ulcers of hands and/or feet. Denies Cold extremities.   Neurologic: Denies dizziness. Denies headache. Denies difficulty speaking. Admits loss of strength to right hand. Denies numbness or tingling.   Psychiatric: Denies depression. Denies anxiety. Denies suicidal/homicidal ideations.    Objective:     Visit Vitals  /81 (BP Location: Right arm, Patient Position: Sitting)   Pulse 84   Temp 97.8 °F (36.6 °C) (Oral)   Resp 20   Ht 5' 6" (1.676 m)   Wt 75.7 kg (166 lb 12.8 oz)   SpO2 100%   BMI 26.92 kg/m²     Physical " Exam    General Appearance: alert, pleasant, in no acute distress.  Skin: Skin color, texture, turgor normal. No rashes or lesions.  Eyes:  extraocular movement intact (EOMI), pupils equal, round, reactive to light and accommodation, conjunctiva clear.  ENT: No oral or nasal ulcers.  Neck:  Neck supple. No adenopathy.   Lungs: CTA bilaterally without crackles, rhonchi, or wheezes.   Heart: RRR w/o murmurs.  No edema. 2+ DP pulse.  Neuro: Alert, oriented, CN II-XII GI, sensory and motor innervation intact.  Musculoskeletal:  Right hand puffy, tenderness to 2nd MCP, unable to fully make fist, left hand, no tenderness to MCPs/PIPs/DIPs, no pain to bilateral wrists, FROM with no discomfort, squaring of CMC, no pain to bilateral elbows or shoulders, no pain or tenderness noted to bilateral knees, no pain to bilateral ankles, mild swelling and erythema noted to 2nd left toe, otherwise no pain to bilateral MTPs  Psych: Alert, oriented, normal eye contact.    Labs Reviewed:   5/2024 ALIE 1.3 (<1), RF/CCP negative, elevated CRP at 100    05/21/2024 immunoglobulins IgE elevated at 357 (0-100),    06/19/2024: ALIE negative, HLA B27 negative, C3/C4 okay, TSH okay, TPO negative, free T3 and T4 okay, uric acid 5.6, RF <5 (< 14), CCP less than 8 (<16.99), CRP 2.4 (<0.5), ESR 44 (<30), hep B/C/A all negative, urine with no blood or protein noted    08/19/2024 CMP Ca+ 10.4, otherwise okay, CRP 19.6 (<5)-previously noted at 103 months ago, uric acid 6.3, CBC okay, immunoglobulins okay, HIV negative, ESR 9 (<20) previously noted at 413 months ago, kappa free light chain 2.26 (0.3300-1.94), lambda free light chain okay, kappa lambda ratio okay.  SPEP no M spike indicate of of monoclonal protein is identified.  IFA: Immunofixation shows a normal pattern of immunoglobulins.  No monoclonal band detected.  Immunoglobulins IgG, IgA and IgM all okay    Rheumatology:  Lab Results   Component Value Date    INTER SEE COMMENTS 05/10/2024     "SEDRATE 8 10/04/2024    LABURIC 6.3 (H) 2024        Chemistry:  Lab Results   Component Value Date     10/04/2024    K 4.2 10/04/2024    BUN 19.5 10/04/2024    CREATININE 0.88 10/04/2024    EGFRNORACEVR >60 10/04/2024    GLUCOSE 86 10/04/2024    CALCIUM 10.3 (H) 10/04/2024    ALKPHOS 76 10/04/2024    LABPROT 7.6 10/04/2024    LABPROT 13.7 2024    ALBUMIN 4.0 10/04/2024    BILIDIR 0.3 02/15/2022    IBILI 0.40 02/15/2022    AST 19 10/04/2024    ALT 30 10/04/2024        Hematology:  Lab Results   Component Value Date    WBC 5.82 10/04/2024    HGB 13.5 10/04/2024    HCT 40.5 10/04/2024     10/04/2024        Urine:  Lab Results   Component Value Date    APPEARANCEUA Clear 05/15/2024    SGUA 1.027 05/15/2024    PROTEINUA Negative 05/15/2024    KETONESUA Negative 05/15/2024    LEUKOCYTESUR 75 (A) 05/15/2024    RBCUA 0-5 05/15/2024    WBCUA 6-10 (A) 05/15/2024    BACTERIA None Seen 05/15/2024    SQEPUA None Seen 05/15/2024        No results found for: "PROTEINURINE", "CREATRANDUR", "UPROTCREA"     Imagin2024 CXRay: Impression: No acute disease or significant cardiopulmonary abnormality.     2024 Bilateral Hand Xrays; IMPRESSION: Osteopenia and mild degenerative changes in bilateral hands and STT joints.   Bilateral Ankle Xray: IMPRESSION: Mild to moderate degenerative changes in bilateral feet.  Left lateral ankle joint soft tissue swelling and tiny lateral malleolar osteophyte also on the left. No significant degenerative changes in the right ankle.     2024 MRI right hand/fingers without contrast:  Longitudinal tear of the flexor digitorum superficialis and profundus of the 3rd digit beginning near the head was 3rd metacarpal distally to the midportion of the middle phalanx.     2024: Left and Right Shoulder x-ray: Impression:No acute abnormalities are seen  Left Foot Xray: Impression: Degenerative changes. Tiny anterior calcaneal spur.  Right Foot Xray:  Impression: " Minimal degenerative changes.    Assessment:       ICD-10-CM ICD-9-CM   1. Inflammatory arthritis  M19.90 714.9   2. Swelling of multiple joints  M25.40 719.09   3. Bilateral foot pain  M79.671 729.5    M79.672    4. Acute pain of both shoulders  M25.511 719.41    M25.512    5. Elevated IgE level  R76.8 795.79          Plan:     1. Inflammatory Arthritis/ concerns for Palindromic RA  Presented for evaluation of ongoing joint pain with swelling. Symptoms started in May 2024 with swelling to her left 2nd toe- reports went to [facility] for evaluation and was treated with antibiotics- states after taking does, severe pain to all joints, ended up presenting to the ER x2, 2nd visit she was admitted x5 days at [facility].  She had several tests complete, initially ALIE drawn noted be very low titer positive, later she was referred to Rheumatology and was seen in [place], repeat lab of ALIE noted to be negative along with negative HLA B27 and CCP.  ESR noted at 44 (<30), CRP 2.4 (<0.5).  Uric acid 5.3.  After her visit with Rheumatology in [place], was noted that there were some concerns for inflammatory arthritis and started on Prednisone 5 mg po qd at the time.  She also had an MRI completed of her right hand and noted with longitudinal tear of the flexor digitorum superficialis and profundus over the 3rd digit, she is awaiting a call back from Ortho.  Prednisone has helped with the pain.  On initial exam right hand puffy, tenderness to 2nd and 3rd MCP, unable to fully make fist,  left wrist with some swelling, squaring of CMC, mild tenderness noted to left wrist with ROM, mild tenderness with ROM right shoulder, mild tenderness to bilateral ankles, mild swelling and erythema noted to 2nd left toe, mild tenderness to 4th right MTP with some swelling noted.  She was started on Sulfasalazine in August 2024 500 mg p.o. b.i.d. x2 weeks then increase dose to 1000 mg p.o. b.i.d.-she reports taking x1 month and was recently has stopped for  the past 3 days she did not find it to be helpful.  She has now been off of Prednisone for the past 3 weeks and has continued to use Advil PRN.  She continues to have swelling and pain off and on, at this time today she denies any significant pain.  Today on exam, right hand remains puffy, mild tenderness to 2nd MCP, still unable to fully make a fist on right hand, otherwise no synovitis noted mild swelling and erythema of left 2nd toe remains with mild tenderness.   - Self d/c Sulfasalazine 3 days ago  - Start MTX as directed below- medication reviewed and discussed at length including s/e and that it may take up to 12 weeks for full efficacy of medication.   - Okay to continue Advil PRN  - As prednisone on hand if needed, encouraged to use if flares as directed  - Cxray May 2024 ok.   - Labs today and again in 4 weeks after starting MTX (all previous labs and xrays from 8/2024 reviewed and discussed today)    - Discussed risks and benefits of Methotrexate (MTX) in detail. MTX is an immunosuppressant medication.  When used in high doses (such as in cancer), it may have many side effects.  The doses that are used in rheumatological disorders are much lower.  Side effects were explained to the patient, including kidney and liver damage, bone marrow suppression, increased infection risk, mouth sores, hair loss, nausea, GI symptoms.   Start Methotrexate at 10 mg per week x2 weeks then increase dose to 15 mg po qweek (ok to split dose and take 3 tab in am and 3 tab in pm)  Start folic 1mg daily to prevent some of the side effects of methotrexate.  Labwork: CBC and a CMP should be checked at baseline, monthly for the first few months and every 3 months afterwards.  Check a Hepatitis Panel and a Chest X-Ray prior to initiation of methotrexate.  Patient to call with any concerns and adverse effects of MTX.  - Methotrexate is also teratogenic, so birth control is needed while on this medication if applicable.  Counselled on  limiting alcohol use to 1-2 drinks/week while on methotrexate given potential liver toxicity.    2. Bilateral foot/Hand Pain  6/19/2024 Bilateral Hand Xrays; IMPRESSION: Osteopenia and mild degenerative changes in bilateral hands and STT joints.   Bilateral Ankle Xray: IMPRESSION: Mild to moderate degenerative changes in bilateral feet.  Left lateral ankle joint soft tissue swelling and tiny lateral malleolar osteophyte also on the left. No significant degenerative changes in the right ankle.   -08/02/2024 MRI right hand/fingers without contrast:  Longitudinal tear of the flexor digitorum superficialis and profundus of the 3rd digit beginning near the head was 3rd metacarpal distally to the midportion of the middle phalanx.   - Awaiting a call back from Ortho for follow up of right hand MRI for further eval      3. Shoulder Pain  -8/20/2024: Left and Right Shoulder x-ray: Impression:No acute abnormalities are seen  Left Foot Xray: Impression: Degenerative changes. Tiny anterior calcaneal spur.  Right Foot Xray:  Impression: Minimal degenerative changes., possible PT in the future    4. Elevated IgE  - Repeat IgE today, remaining Immunoglobulins ok 8/2024      Follow up in about 3 months (around 1/4/2025) for NP Follow Up. In addition to their scheduled follow up, the patient has also been instructed to follow up on as needed basis.       Total time spent with patient and documentation is 55 minutes. All questions were answered to patient's satisfaction and patient verbalized understanding.  This includes face to face time and non-face to face time preparing to see the patient (eg, review of tests), obtaining and/or reviewing separately obtained history, documenting clinical information in the electronic or other health record, independently interpreting results and communicating results to the patient/family/caregiver, or care coordinator.       MARGARET Cox        Orders Placed This Encounter    Procedures    IgE     Standing Status:   Future     Number of Occurrences:   1     Standing Expiration Date:   12/3/2025    CBC Auto Differential     Standing Status:   Future     Number of Occurrences:   1     Standing Expiration Date:   12/3/2025    Comprehensive Metabolic Panel     Standing Status:   Future     Number of Occurrences:   1     Standing Expiration Date:   12/3/2025    C-Reactive Protein     Standing Status:   Future     Number of Occurrences:   1     Standing Expiration Date:   12/3/2025    Sedimentation rate     Standing Status:   Future     Number of Occurrences:   1     Standing Expiration Date:   12/3/2025

## 2024-10-04 ENCOUNTER — OFFICE VISIT (OUTPATIENT)
Dept: RHEUMATOLOGY | Facility: CLINIC | Age: 69
End: 2024-10-04
Payer: COMMERCIAL

## 2024-10-04 ENCOUNTER — LAB VISIT (OUTPATIENT)
Dept: LAB | Facility: HOSPITAL | Age: 69
End: 2024-10-04
Attending: NURSE PRACTITIONER
Payer: COMMERCIAL

## 2024-10-04 VITALS
TEMPERATURE: 98 F | HEART RATE: 84 BPM | RESPIRATION RATE: 20 BRPM | DIASTOLIC BLOOD PRESSURE: 81 MMHG | SYSTOLIC BLOOD PRESSURE: 117 MMHG | BODY MASS INDEX: 26.81 KG/M2 | WEIGHT: 166.81 LBS | OXYGEN SATURATION: 100 % | HEIGHT: 66 IN

## 2024-10-04 DIAGNOSIS — M79.671 BILATERAL FOOT PAIN: ICD-10-CM

## 2024-10-04 DIAGNOSIS — M79.672 BILATERAL FOOT PAIN: ICD-10-CM

## 2024-10-04 DIAGNOSIS — M25.512 ACUTE PAIN OF BOTH SHOULDERS: ICD-10-CM

## 2024-10-04 DIAGNOSIS — R76.8 ELEVATED IGE LEVEL: ICD-10-CM

## 2024-10-04 DIAGNOSIS — M19.90 INFLAMMATORY ARTHRITIS: ICD-10-CM

## 2024-10-04 DIAGNOSIS — M25.40 SWELLING OF MULTIPLE JOINTS: ICD-10-CM

## 2024-10-04 DIAGNOSIS — M19.90 INFLAMMATORY ARTHRITIS: Primary | ICD-10-CM

## 2024-10-04 DIAGNOSIS — M25.511 ACUTE PAIN OF BOTH SHOULDERS: ICD-10-CM

## 2024-10-04 LAB
ALBUMIN SERPL-MCNC: 4 G/DL (ref 3.4–4.8)
ALBUMIN/GLOB SERPL: 1.1 RATIO (ref 1.1–2)
ALP SERPL-CCNC: 76 UNIT/L (ref 40–150)
ALT SERPL-CCNC: 30 UNIT/L (ref 0–55)
ANION GAP SERPL CALC-SCNC: 7 MEQ/L
AST SERPL-CCNC: 19 UNIT/L (ref 5–34)
BASOPHILS # BLD AUTO: 0.02 X10(3)/MCL
BASOPHILS NFR BLD AUTO: 0.3 %
BILIRUB SERPL-MCNC: 0.3 MG/DL
BUN SERPL-MCNC: 19.5 MG/DL (ref 9.8–20.1)
CALCIUM SERPL-MCNC: 10.3 MG/DL (ref 8.4–10.2)
CHLORIDE SERPL-SCNC: 110 MMOL/L (ref 98–107)
CO2 SERPL-SCNC: 25 MMOL/L (ref 23–31)
CREAT SERPL-MCNC: 0.88 MG/DL (ref 0.55–1.02)
CREAT/UREA NIT SERPL: 22
CRP SERPL-MCNC: 12.1 MG/L
EOSINOPHIL # BLD AUTO: 0.13 X10(3)/MCL (ref 0–0.9)
EOSINOPHIL NFR BLD AUTO: 2.2 %
ERYTHROCYTE [DISTWIDTH] IN BLOOD BY AUTOMATED COUNT: 13.2 % (ref 11.5–17)
ERYTHROCYTE [SEDIMENTATION RATE] IN BLOOD: 8 MM/HR (ref 0–20)
GFR SERPLBLD CREATININE-BSD FMLA CKD-EPI: >60 ML/MIN/1.73/M2
GLOBULIN SER-MCNC: 3.6 GM/DL (ref 2.4–3.5)
GLUCOSE SERPL-MCNC: 86 MG/DL (ref 82–115)
HCT VFR BLD AUTO: 40.5 % (ref 37–47)
HGB BLD-MCNC: 13.5 G/DL (ref 12–16)
IMM GRANULOCYTES # BLD AUTO: 0.01 X10(3)/MCL (ref 0–0.04)
IMM GRANULOCYTES NFR BLD AUTO: 0.2 %
LYMPHOCYTES # BLD AUTO: 2.05 X10(3)/MCL (ref 0.6–4.6)
LYMPHOCYTES NFR BLD AUTO: 35.2 %
MCH RBC QN AUTO: 32.1 PG (ref 27–31)
MCHC RBC AUTO-ENTMCNC: 33.3 G/DL (ref 33–36)
MCV RBC AUTO: 96.2 FL (ref 80–94)
MONOCYTES # BLD AUTO: 0.44 X10(3)/MCL (ref 0.1–1.3)
MONOCYTES NFR BLD AUTO: 7.6 %
NEUTROPHILS # BLD AUTO: 3.17 X10(3)/MCL (ref 2.1–9.2)
NEUTROPHILS NFR BLD AUTO: 54.5 %
NRBC BLD AUTO-RTO: 0 %
PLATELET # BLD AUTO: 265 X10(3)/MCL (ref 130–400)
PMV BLD AUTO: 9.1 FL (ref 7.4–10.4)
POTASSIUM SERPL-SCNC: 4.2 MMOL/L (ref 3.5–5.1)
PROT SERPL-MCNC: 7.6 GM/DL (ref 5.8–7.6)
RBC # BLD AUTO: 4.21 X10(6)/MCL (ref 4.2–5.4)
SODIUM SERPL-SCNC: 142 MMOL/L (ref 136–145)
WBC # BLD AUTO: 5.82 X10(3)/MCL (ref 4.5–11.5)

## 2024-10-04 PROCEDURE — 80053 COMPREHEN METABOLIC PANEL: CPT

## 2024-10-04 PROCEDURE — 85025 COMPLETE CBC W/AUTO DIFF WBC: CPT

## 2024-10-04 PROCEDURE — 36415 COLL VENOUS BLD VENIPUNCTURE: CPT

## 2024-10-04 PROCEDURE — 86140 C-REACTIVE PROTEIN: CPT

## 2024-10-04 PROCEDURE — 99214 OFFICE O/P EST MOD 30 MIN: CPT | Mod: PBBFAC | Performed by: NURSE PRACTITIONER

## 2024-10-04 PROCEDURE — 82785 ASSAY OF IGE: CPT | Mod: ,,, | Performed by: NURSE PRACTITIONER

## 2024-10-04 PROCEDURE — 85652 RBC SED RATE AUTOMATED: CPT

## 2024-10-04 RX ORDER — METHOTREXATE 2.5 MG/1
TABLET ORAL
Qty: 30 TABLET | Refills: 0 | Status: SHIPPED | OUTPATIENT
Start: 2024-10-04

## 2024-10-04 RX ORDER — FOLIC ACID 1 MG/1
1 TABLET ORAL DAILY
Qty: 30 TABLET | Refills: 6 | Status: SHIPPED | OUTPATIENT
Start: 2024-10-04 | End: 2025-04-02

## 2024-10-07 LAB — PHADIOTOP IGE QN: 106 KU/L

## 2024-10-08 ENCOUNTER — PATIENT MESSAGE (OUTPATIENT)
Dept: RHEUMATOLOGY | Facility: CLINIC | Age: 69
End: 2024-10-08
Payer: COMMERCIAL

## 2024-10-08 DIAGNOSIS — M19.90 INFLAMMATORY ARTHRITIS: Primary | ICD-10-CM

## 2024-10-08 DIAGNOSIS — Z79.899 HIGH RISK MEDICATION USE: ICD-10-CM

## 2024-10-09 ENCOUNTER — TELEPHONE (OUTPATIENT)
Dept: RHEUMATOLOGY | Facility: CLINIC | Age: 69
End: 2024-10-09
Payer: COMMERCIAL

## 2024-10-09 ENCOUNTER — LAB VISIT (OUTPATIENT)
Dept: LAB | Facility: HOSPITAL | Age: 69
End: 2024-10-09
Attending: INTERNAL MEDICINE
Payer: COMMERCIAL

## 2024-10-09 ENCOUNTER — DOCUMENTATION ONLY (OUTPATIENT)
Dept: RHEUMATOLOGY | Facility: CLINIC | Age: 69
End: 2024-10-09

## 2024-10-09 ENCOUNTER — OFFICE VISIT (OUTPATIENT)
Dept: RHEUMATOLOGY | Facility: CLINIC | Age: 69
End: 2024-10-09
Payer: COMMERCIAL

## 2024-10-09 VITALS
WEIGHT: 166.81 LBS | HEIGHT: 66 IN | RESPIRATION RATE: 20 BRPM | BODY MASS INDEX: 26.81 KG/M2 | DIASTOLIC BLOOD PRESSURE: 72 MMHG | SYSTOLIC BLOOD PRESSURE: 113 MMHG | HEART RATE: 91 BPM | TEMPERATURE: 98 F | OXYGEN SATURATION: 98 %

## 2024-10-09 DIAGNOSIS — G89.29 CHRONIC PAIN OF BOTH SHOULDERS: ICD-10-CM

## 2024-10-09 DIAGNOSIS — M25.40 SWELLING OF MULTIPLE JOINTS: ICD-10-CM

## 2024-10-09 DIAGNOSIS — M79.672 BILATERAL FOOT PAIN: ICD-10-CM

## 2024-10-09 DIAGNOSIS — M25.511 CHRONIC PAIN OF BOTH SHOULDERS: ICD-10-CM

## 2024-10-09 DIAGNOSIS — M19.90 INFLAMMATORY ARTHRITIS: Primary | ICD-10-CM

## 2024-10-09 DIAGNOSIS — M25.512 CHRONIC PAIN OF BOTH SHOULDERS: ICD-10-CM

## 2024-10-09 DIAGNOSIS — M79.671 BILATERAL FOOT PAIN: ICD-10-CM

## 2024-10-09 DIAGNOSIS — M79.641 BILATERAL HAND PAIN: ICD-10-CM

## 2024-10-09 DIAGNOSIS — M19.90 INFLAMMATORY ARTHRITIS: ICD-10-CM

## 2024-10-09 DIAGNOSIS — M79.642 BILATERAL HAND PAIN: ICD-10-CM

## 2024-10-09 PROCEDURE — 3288F FALL RISK ASSESSMENT DOCD: CPT | Mod: CPTII,,, | Performed by: INTERNAL MEDICINE

## 2024-10-09 PROCEDURE — 36415 COLL VENOUS BLD VENIPUNCTURE: CPT

## 2024-10-09 PROCEDURE — 1125F AMNT PAIN NOTED PAIN PRSNT: CPT | Mod: CPTII,,, | Performed by: INTERNAL MEDICINE

## 2024-10-09 PROCEDURE — 3078F DIAST BP <80 MM HG: CPT | Mod: CPTII,,, | Performed by: INTERNAL MEDICINE

## 2024-10-09 PROCEDURE — 3008F BODY MASS INDEX DOCD: CPT | Mod: CPTII,,, | Performed by: INTERNAL MEDICINE

## 2024-10-09 PROCEDURE — 3074F SYST BP LT 130 MM HG: CPT | Mod: CPTII,,, | Performed by: INTERNAL MEDICINE

## 2024-10-09 PROCEDURE — 99213 OFFICE O/P EST LOW 20 MIN: CPT | Mod: PBBFAC | Performed by: INTERNAL MEDICINE

## 2024-10-09 PROCEDURE — G2211 COMPLEX E/M VISIT ADD ON: HCPCS | Mod: S$PBB,,, | Performed by: INTERNAL MEDICINE

## 2024-10-09 PROCEDURE — 1101F PT FALLS ASSESS-DOCD LE1/YR: CPT | Mod: CPTII,,, | Performed by: INTERNAL MEDICINE

## 2024-10-09 PROCEDURE — 99417 PROLNG OP E/M EACH 15 MIN: CPT | Mod: S$PBB,,, | Performed by: INTERNAL MEDICINE

## 2024-10-09 PROCEDURE — 1159F MED LIST DOCD IN RCRD: CPT | Mod: CPTII,,, | Performed by: INTERNAL MEDICINE

## 2024-10-09 PROCEDURE — 99215 OFFICE O/P EST HI 40 MIN: CPT | Mod: S$PBB,,, | Performed by: INTERNAL MEDICINE

## 2024-10-09 RX ORDER — SULFASALAZINE 500 MG/1
1500 TABLET ORAL 2 TIMES DAILY
Qty: 180 TABLET | Refills: 5 | Status: SHIPPED | OUTPATIENT
Start: 2024-10-09

## 2024-10-09 NOTE — PROGRESS NOTES
Spoke with Earl Valenzuela in lab and made him aware of lab that needs to be drawn today. Do not draw cbc.cmp today

## 2024-10-09 NOTE — TELEPHONE ENCOUNTER
Spoke with Earl Valenzuela in lab-chemistry  Patient MRN number and name given and advised she will be heading to lab for lab draw and the only lab to be drawn is the [place] IgM and IgG Antibody.  Do not draw CBC,CMP today this lab is due in 4 weeks. Earl will communicate to staff. Hand written not also written and given to patient to hand to staff as well. Reminder will be placed in chart to call patient in 4 weeks to have labs done.

## 2024-10-09 NOTE — PROGRESS NOTES
"  Patient ID: [id number]     Chief Complaint: Follow-up (Recently seen by Provider Malena last week/Dx Inflammatory Arthritis; Swelling of multiple joints/Pain:Rt shoulder blade rates 5/10 and hands when used 7/10/Medications: Methotrexate prescription filled and picked up but refuses to take after reading side effects.)      HPI:     Leah Pepper is a 69 y.o. female here today for a follow up visit for Inflammatory Arthritis, ?Palindromic RA.      Presents for follow up of pain in swelling in multiple joints. She was recent seen in [place] by Rheumatology June 2024.  She reports pain started around May of 2024- states after a night of dancing, she had some swelling to her left 2nd toe- didn't think much of it, left for a cruise the following day, went on the cruise and came back and saw [facility]- given antibiotics and prednisone, took as directed- she states within the hour she states every joint in her body was hurting, had swelling in her leg. States hand was swollen up, left shoulder pain was severe. Presented to [facility] due to pain and swelling and ended getting dx with Pneumonia- given meds but did not take as she did not feel that it was Pneumonia, the following day she went to [facility] and admitted x 5 days- had a "whole slew" of test completed and reports all negative (did have a low titer +ALIE but repeat negative). She she was given Cephalexin again to see if she would have the same reaction and states same thing occurred- swelling and severe joint pain all over again. She was enc to see a Rheumatologist, could not get in here in [place] so went to [place]- did a lot of test, states was told her immune system was intact but did have a MRI of her right hand- she states she was told she had a "tear" and was advised to see an Ortho- completed from [facility]. She reports she continues to have pain, mainly to her right hand- has swelling, cannot make a fist, left hand is ok, but pain with movement and swelling to her left " "wrist. She states she is a very active 69 year old- cleans her own house- was playing soccer until she was 58- so this has got her down. She states the pain is "funny", because it will "move", not always in the same place some days in one place then another will be all joints. Joints that have been bothersome have been right hand, left ankle, right elbow, right toes, right shoulder- left shoulder and elbow have been ok but states initially did have left shoulder pain, now moved to right side.  She reports with shoulder pain she is unable to raise her arm.  She denies any difficulty getting up from a seated position, no muscle weakness.  She does report areas have been red/warm and swollen when painful. She does have a history of right wrist fracture in 2021 but this wrist has not been painful, just the hand and fingers. She denies any morning stiffness when waking up. She has taken Prednisone and did help but also had to take Tylenol with it (only a 5 mg dosing), she has been taking Advil- will take 3-4 tab at a time bid to help with pain and "loosen" up and then again after dinner. Pain will improve with movement at times with the exception of her right hand, other joints pain does improve with movement. She does feel when she was on Prednisone pain was much better, when off pain returns.     Rheumatology note from [place]: Joint pain Starting early May while on a [place] cruise in [place] had Lt 2nd toe swelling, redness and pain, then progressed to b/l hand and wrist pain and swelling with functional limitations. Was in ER twice who treated her with ABX and Steroids.  Some improvement on prednisone taper, still with stiffness and swelling int he right hand and lt wrist. Very suggestive for RA. Schedule MRI of Rt hand and wrist in [place] if possible     October 9, 2024: Here today for follow up. She has been off of her Sulfasalazine for over a week. Was prescribed methotrexate on 10/4/2024, and patient filled " prescription but did not take medication given concern about side effects. Continues to have left second toe redness and swelling without pain and right first MCP pain/redness/swelling with decreased flexion. Also reports occasional shoulder blade pain (right>left), left wrist pain and swelling, right ankle swelling, which improve with Tylenol--although Advil works best. She denies any rashes or fever.     Denies any recent illness or hospitalizations since last visit.     Denies history of fevers, rashes, photosensitivity, oral or nasal ulcers, h/o MI, stroke, seizures, h/o PE or DVT, Raynaud's phenomenon, uveitis, malignancies.   Family history of autoimmune disease: None  Pregnancies: 2  Miscarriages: None  Smoking:  Quit 1987, start age of 14 years old, hx <1/2 ppd- did quit off and on for several years during this time, states maybe smoked 5 years total.     Social History     Tobacco Use   Smoking Status Former    Current packs/day: 0.25    Average packs/day: 0.3 packs/day for 10.0 years (2.5 ttl pk-yrs)    Types: Cigarettes   Smokeless Tobacco Never        -------------------------------------    No known health problems        Past Surgical History:   Procedure Laterality Date    COSMETIC SURGERY         Review of patient's allergies indicates:   Allergen Reactions    Cephalexin Other (See Comments)     Swelling and joint pain    Diphenhydramine      Other reaction(s): heart races       Current Outpatient Medications   Medication Instructions    folic acid (FOLVITE) 1 mg, Oral, Daily    predniSONE (DELTASONE) 5 MG tablet Take 2 tab po qd x 3 days then take 1 tab po qd x 2 days    sulfaSALAzine (AZULFIDINE) 1,500 mg, Oral, 2 times daily         Social History     Socioeconomic History    Marital status:    Tobacco Use    Smoking status: Former     Current packs/day: 0.25     Average packs/day: 0.3 packs/day for 10.0 years (2.5 ttl pk-yrs)     Types: Cigarettes    Smokeless tobacco: Never   Substance  and Sexual Activity    Alcohol use: Yes     Alcohol/week: 3.0 standard drinks of alcohol     Types: 3 Glasses of wine per week     Comment: ocassional    Drug use: Never    Sexual activity: Yes     Partners: Male     Social Drivers of Health     Financial Resource Strain: Low Risk  (5/20/2024)    [facility]    Overall Financial Resource Strain (CARDIA)     Difficulty of Paying Living Expenses: Not very hard   Food Insecurity: No Food Insecurity (5/20/2024)    Received from [facility] Its Subsidiaries and Affiliates    Hunger Vital Sign     Worried About Running Out of Food in the Last Year: Never true     Ran Out of Food in the Last Year: Never true   Transportation Needs: No Transportation Needs (5/12/2024)    PRAPARE - Transportation     Lack of Transportation (Medical): No     Lack of Transportation (Non-Medical): No   Physical Activity: Unknown (5/12/2024)    Exercise Vital Sign     Days of Exercise per Week: 2 days   Stress: No Stress Concern Present (5/12/2024)    [employer] - Occupational Stress Questionnaire     Feeling of Stress : Not at all   Housing Stability: Unknown (5/12/2024)    Housing Stability Vital Sign     Unable to Pay for Housing in the Last Year: No        Family History   Problem Relation Name Age of Onset    No Known Problems Mother      Heart disease Father Alejandro     Diabetes Father Alejandro     No Known Problems Sister      No Known Problems Brother          Immunization History   Administered Date(s) Administered    COVID-19, MRNA, LN-S, PF (Pfizer) (Purple Cap) 02/04/2021, 02/25/2021, 09/29/2021, 05/12/2022    Pneumococcal Conjugate - 13 Valent 01/29/2021    Zoster Recombinant 01/29/2021, 07/29/2021       Patient Care Team:  Janeth Tian MD as PCP - General (Family Medicine)     Subjective:     ROS    Constitutional:   "Denies chills. Denies fever. Denies night sweats. Denies weight loss. Denies sleep disturbance.   Ophthalmology: Denies blurred vision. Denies diminished visual acuity. Denies dry eyes. Denies eye pain. Denies Itching and redness.   ENT: Denies decreased hearing. Denies oral ulcers. Denies epistaxis. Denies swelling of ears or throat. Denies dry mouth. Denies swollen glands. Denies hair loss.   Endocrine: Denies diabetes. Denies thyroid Problems.   Respiratory: Denies cough. Denies shortness of breath. Denies shortness of breath with exertion. Denies hemoptysis.   Cardiovascular: Denies chest pain at rest. Denies chest pain with exertion. Denies Irregular heartbeat. Denies palpitations. Denies edema. Denies orthopnea.   Gastrointestinal: Denies abdominal pain. Denies diarrhea. Denies nausea. Denies vomiting. Denies hematemesis or hematochezia. Denies change in appetite. Denies heartburn.  Genitourinary: Denies dysuria. Denies urinary frequency. Denies urinary urgency. Denies blood in urine.  Musculoskeletal: See HPI for details  Integumentary: Denies rash. Denies Itching. Denies dry skin. Denies photosensitivity.   Peripheral Vascular: Denies Ulcers of hands and/or feet. Denies Cold extremities.   Neurologic: Denies dizziness. Denies headache. Denies difficulty speaking. Admits loss of strength to right hand. Denies numbness or tingling.   Psychiatric: Denies depression. Denies anxiety. Denies suicidal/homicidal ideations.    Objective:     Visit Vitals  /72 (BP Location: Left arm, Patient Position: Sitting)   Pulse 91   Temp 97.7 °F (36.5 °C) (Oral)   Resp 20   Ht 5' 6" (1.676 m)   Wt 75.7 kg (166 lb 12.8 oz)   SpO2 98%   BMI 26.92 kg/m²       Physical Exam    General Appearance: alert, pleasant, in no acute distress.  Skin: Skin color, texture, turgor normal. No rashes or lesions.  Eyes:  extraocular movement intact (EOMI), pupils equal, round, reactive to light and accommodation, conjunctiva clear.  ENT: No " oral or nasal ulcers.  Neck:  Neck supple. No adenopathy.   Lungs: CTA bilaterally without crackles, rhonchi, or wheezes.   Heart: RRR w/o murmurs.  No edema. 2+ DP pulse.  Neuro: Alert, oriented, CN II-XII GI, sensory and motor innervation intact with exception of decreased flexion of right first and second digits.  Musculoskeletal:   Psych: Alert, oriented, normal eye contact.    Joint Exam:  DIPs, PIPs, MCPs: right first MCP swelling and redness, tender to palpation, decreased flexion, no nodules.  Wrists: no pain on palpation, no swelling or redness, good range of motion.  Elbows: no pain on palpation, no swelling or redness, good range of motion, no nodules.  Shoulders: no pain on palpation, no swelling or redness, good range of motion.  Back/Neck: no pain on palpation.   Hips: no pain on palpation, good range of motion.  Knees: no pain on palpation, no swelling or redness, good range of motion.  Ankles: no pain on palpation, no swelling or redness, good range of motion.  Feet: left second toe swelling and redness, non-tender, , no nodules.    Labs Reviewed:   5/2024 ALIE 1.3 (<1), RF/CCP negative, elevated CRP at 100    05/21/2024 immunoglobulins IgE elevated at 357 (0-100),    06/19/2024: ALIE negative, HLA B27 negative, C3/C4 okay, TSH okay, TPO negative, free T3 and T4 okay, uric acid 5.6, RF <5 (< 14), CCP less than 8 (<16.99), CRP 2.4 (<0.5), ESR 44 (<30), hep B/C/A all negative, urine with no blood or protein noted    08/19/2024 CMP Ca+ 10.4, otherwise okay, CRP 19.6 (<5)-previously noted at 103 months ago, uric acid 6.3, CBC okay, immunoglobulins okay, HIV negative, ESR 9 (<20) previously noted at 413 months ago, kappa free light chain 2.26 (0.3300-1.94), lambda free light chain okay, kappa lambda ratio okay.  SPEP no M spike indicate of of monoclonal protein is identified.  IFA: Immunofixation shows a normal pattern of immunoglobulins.  No monoclonal band detected.  Immunoglobulins IgG, IgA and IgM all  "okay    10/4/2024 CBC okay, Sed rate 8 (<20), CRP 12.10 (5), CMP Ca 10.3 otherwise okay    Rheumatology:  Lab Results   Component Value Date    INTER SEE COMMENTS 05/10/2024    SEDRATE 8 10/04/2024    LABURIC 6.3 (H) 2024        Chemistry:  Lab Results   Component Value Date     10/04/2024    K 4.2 10/04/2024    BUN 19.5 10/04/2024    CREATININE 0.88 10/04/2024    EGFRNORACEVR >60 10/04/2024    GLUCOSE 86 10/04/2024    CALCIUM 10.3 (H) 10/04/2024    ALKPHOS 76 10/04/2024    LABPROT 7.6 10/04/2024    LABPROT 13.7 2024    ALBUMIN 4.0 10/04/2024    BILIDIR 0.3 02/15/2022    IBILI 0.40 02/15/2022    AST 19 10/04/2024    ALT 30 10/04/2024        Hematology:  Lab Results   Component Value Date    WBC 5.82 10/04/2024    HGB 13.5 10/04/2024    HCT 40.5 10/04/2024     10/04/2024        Urine:  Lab Results   Component Value Date    APPEARANCEUA Clear 05/15/2024    SGUA 1.027 05/15/2024    PROTEINUA Negative 05/15/2024    KETONESUA Negative 05/15/2024    LEUKOCYTESUR 75 (A) 05/15/2024    RBCUA 0-5 05/15/2024    WBCUA 6-10 (A) 05/15/2024    BACTERIA None Seen 05/15/2024    SQEPUA None Seen 05/15/2024        No results found for: "PROTEINURINE", "CREATRANDUR", "UPROTCREA"     Imagin2024 CXRay: Impression: No acute disease or significant cardiopulmonary abnormality.     2024 Bilateral Hand Xrays; IMPRESSION: Osteopenia and mild degenerative changes in bilateral hands and STT joints.   Bilateral Ankle Xray: IMPRESSION: Mild to moderate degenerative changes in bilateral feet.  Left lateral ankle joint soft tissue swelling and tiny lateral malleolar osteophyte also on the left. No significant degenerative changes in the right ankle.     2024 MRI right hand/fingers without contrast:  Longitudinal tear of the flexor digitorum superficialis and profundus of the 3rd digit beginning near the head was 3rd metacarpal distally to the midportion of the middle phalanx.     2024: Left and " Right Shoulder x-ray: Impression:No acute abnormalities are seen  Left Foot Xray: Impression: Degenerative changes. Tiny anterior calcaneal spur.  Right Foot Xray:  Impression: Minimal degenerative changes.    Assessment:       ICD-10-CM ICD-9-CM   1. Inflammatory arthritis  M19.90 714.9   2. Swelling of multiple joints  M25.40 719.09   3. Bilateral foot pain  M79.671 729.5    M79.672    4. Bilateral hand pain  M79.641 729.5    M79.642    5. Chronic pain of both shoulders  M25.511 719.41    G89.29 338.29    M25.512             Plan:     1. Inflammatory Arthritis  Possible reactive arthritis following viral infection acquired during trip to [place] in May 2024. Check Chikungunya antibodies today.  Inflammatory markers have improved since May 2024.  Initially responded well to prednisone but stopped taking given concerns for prolonged steroid use.  Started on sulfasalazine 1000 mg BID in August 2024nitially discontinued it because of confusion that it was an antibiotic, but ultimately took for 2 months with mild improvement in symptoms.  Prescribed Methotrexate and Folic acid on 10/4/2024. Patient filled prescription but did not take given concern for side effects.   Start sulfasalazine 1500 mg BID. Prednisone 5 mg daily PRN for flares.  Agreeable to resume sulfasalazine at increased dose and consider methotrexate if no clinical improvement.  Patient advised to bring most recent infectious workup from [place] to follow up clinic visit.  Chikungunya antibodies today. CMP, CBC in 4 weeks.    - Discussed risks and benefits of Methotrexate (MTX) in detail. MTX is an immunosuppressant medication.  When used in high doses (such as in cancer), it may have many side effects.  The doses that are used in rheumatological disorders are much lower.  Side effects were explained to the patient, including kidney and liver damage, bone marrow suppression, increased infection risk, mouth sores, hair loss, nausea, GI symptoms. -  Methotrexate is also teratogenic, so birth control is needed while on this medication if applicable.  Counselled on limiting alcohol use to 1-2 drinks/week while on methotrexate given potential liver toxicity. - Patient to consider in the future if no improvement with increased dose of sulfasalazine.     2. Swelling of multiple joints  3. Bilateral foot pain  4. Bilateral hand pain  6/19/2024 Bilateral Hand Xrays; IMPRESSION: Osteopenia and mild degenerative changes in bilateral hands and STT joints.   Bilateral Ankle Xray: IMPRESSION: Mild to moderate degenerative changes in bilateral feet.  Left lateral ankle joint soft tissue swelling and tiny lateral malleolar osteophyte also on the left. No significant degenerative changes in the right ankle.   Left Foot Xray: Impression: Degenerative changes. Tiny anterior calcaneal spur.  Right Foot Xray:  Impression: Minimal degenerative changes, possible PT in the future  -08/02/2024 MRI right hand/fingers without contrast:  Longitudinal tear of the flexor digitorum superficialis and profundus of the 3rd digit beginning near the head was 3rd metacarpal distally to the midportion of the middle phalanx.   - Patient awaiting a call back from Ortho for follow up of right hand MRI for further eval      3. Shoulder Pain  -8/20/2024: Left and Right Shoulder x-ray: Impression:No acute abnormalities are seen  -No active complaint today    4. Elevated IgE  - Repeat IgE 106 (<144) in 10/2024, remaining Immunoglobulins ok 8/2024      Follow up in about 2 months (around 12/9/2024). In addition to their scheduled follow up, the patient has also been instructed to follow up on as needed basis.       Total time spent with patient and documentation is 55 minutes. All questions were answered to patient's satisfaction and patient verbalized understanding.      Alondra Geiger MD  [facility] Internal Medicine, PRG-3  10/9/2024

## 2024-11-05 ENCOUNTER — TELEPHONE (OUTPATIENT)
Dept: RHEUMATOLOGY | Facility: CLINIC | Age: 69
End: 2024-11-05
Payer: COMMERCIAL

## 2024-11-05 NOTE — TELEPHONE ENCOUNTER
----- Message from Nurse Berry sent at 10/8/2024  4:29 PM CDT -----  Regarding: Labs in 4 weeks Recently started Methotrexate  We will need to repeat lab in 4 weeks, please place a reminder in the computer for 4 weeks as we recently started MTX.

## 2024-11-05 NOTE — TELEPHONE ENCOUNTER
Placed a tc to patient and communicated Provider Malena response below:     As previously discussed at her recent visit with Dr Wright, this medication does take up to 12 weeks for it to be fully effective, I would encourage her to continue to give medication time to be effective       Patient verbalized full understanding and agrees to resume medication and she will have labs done next week. Patient further encouraged to call our clinic or message us on the portal with any further questions or concerns. To which she agreed.

## 2024-11-05 NOTE — TELEPHONE ENCOUNTER
Placed a tc to remind patient to have labs done as she was started on a new medication Sulfasalazine. Patient reported during this telephone encounter that she took medication up until 3-4 days ago and she stopped medication as she feels it is not helping her and she wasn't to rid her body of medication. Patient reports she can have labs done Tuesday of next week.

## 2024-11-05 NOTE — TELEPHONE ENCOUNTER
----- Message from Nurse Berry sent at 10/4/2024 11:33 AM CDT -----  Regarding: Labs for 5 weeks  Per Malena

## 2024-11-05 NOTE — TELEPHONE ENCOUNTER
Placed a tc to remind patient to have labs done as she was started on a new medication Sulfasalazine. Patient reported during this telephone encounter that she took medication up until 3-4 days ago and she stopped medication as she feels it is not helping her and she wasn't to rid her body of medication. Patient reports she can have labs done Tuesday of next week. Communicated to Provider Malena

## 2024-11-07 ENCOUNTER — TELEPHONE (OUTPATIENT)
Dept: PRIMARY CARE CLINIC | Facility: CLINIC | Age: 69
End: 2024-11-07
Payer: COMMERCIAL

## 2024-11-07 DIAGNOSIS — M25.40 SWELLING OF MULTIPLE JOINTS: Primary | ICD-10-CM

## 2024-11-07 NOTE — TELEPHONE ENCOUNTER
----- Message from Tech Susan sent at 11/7/2024  2:47 PM CST -----  .Type:  Needs Medical Advice    Who Called: Dr. Lio Headley     Symptoms (please be specific):  no     How long has patient had these symptoms:   no    Pharmacy name and phone #:   no    Would the patient rather a call back or a response via [facility]?  No    Best Call Back Number:  [phone]    Additional Information:  no longer accepting any referrals please send pt's referral from 9-12-24 for the I.D. clinic somewhere else thanks

## 2024-11-12 ENCOUNTER — TELEPHONE (OUTPATIENT)
Dept: RHEUMATOLOGY | Facility: CLINIC | Age: 69
End: 2024-11-12
Payer: COMMERCIAL

## 2024-11-12 ENCOUNTER — PATIENT MESSAGE (OUTPATIENT)
Dept: RHEUMATOLOGY | Facility: CLINIC | Age: 69
End: 2024-11-12
Payer: COMMERCIAL

## 2024-11-12 ENCOUNTER — LAB VISIT (OUTPATIENT)
Dept: LAB | Facility: HOSPITAL | Age: 69
End: 2024-11-12
Attending: NURSE PRACTITIONER
Payer: COMMERCIAL

## 2024-11-12 DIAGNOSIS — E83.52 HYPERCALCEMIA: ICD-10-CM

## 2024-11-12 DIAGNOSIS — E83.52 HYPERCALCEMIA: Primary | ICD-10-CM

## 2024-11-12 DIAGNOSIS — Z79.899 HIGH RISK MEDICATION USE: ICD-10-CM

## 2024-11-12 DIAGNOSIS — M19.90 INFLAMMATORY ARTHRITIS: ICD-10-CM

## 2024-11-12 LAB
ALBUMIN SERPL-MCNC: 4 G/DL (ref 3.4–4.8)
ALBUMIN/GLOB SERPL: 1.2 RATIO (ref 1.1–2)
ALP SERPL-CCNC: 69 UNIT/L (ref 40–150)
ALT SERPL-CCNC: 20 UNIT/L (ref 0–55)
ANION GAP SERPL CALC-SCNC: 9 MEQ/L
AST SERPL-CCNC: 19 UNIT/L (ref 5–34)
BASOPHILS # BLD AUTO: 0.02 X10(3)/MCL
BASOPHILS NFR BLD AUTO: 0.3 %
BILIRUB SERPL-MCNC: 0.4 MG/DL
BUN SERPL-MCNC: 16.2 MG/DL (ref 9.8–20.1)
CALCIUM SERPL-MCNC: 10.6 MG/DL (ref 8.4–10.2)
CHLORIDE SERPL-SCNC: 108 MMOL/L (ref 98–107)
CO2 SERPL-SCNC: 26 MMOL/L (ref 23–31)
CREAT SERPL-MCNC: 0.96 MG/DL (ref 0.55–1.02)
CREAT/UREA NIT SERPL: 17
EOSINOPHIL # BLD AUTO: 0.06 X10(3)/MCL (ref 0–0.9)
EOSINOPHIL NFR BLD AUTO: 1 %
ERYTHROCYTE [DISTWIDTH] IN BLOOD BY AUTOMATED COUNT: 11.9 % (ref 11.5–17)
GFR SERPLBLD CREATININE-BSD FMLA CKD-EPI: >60 ML/MIN/1.73/M2
GLOBULIN SER-MCNC: 3.4 GM/DL (ref 2.4–3.5)
GLUCOSE SERPL-MCNC: 91 MG/DL (ref 82–115)
HCT VFR BLD AUTO: 38 % (ref 37–47)
HGB BLD-MCNC: 13.1 G/DL (ref 12–16)
IMM GRANULOCYTES # BLD AUTO: 0.02 X10(3)/MCL (ref 0–0.04)
IMM GRANULOCYTES NFR BLD AUTO: 0.3 %
LYMPHOCYTES # BLD AUTO: 1.54 X10(3)/MCL (ref 0.6–4.6)
LYMPHOCYTES NFR BLD AUTO: 25.2 %
MCH RBC QN AUTO: 32.7 PG (ref 27–31)
MCHC RBC AUTO-ENTMCNC: 34.5 G/DL (ref 33–36)
MCV RBC AUTO: 94.8 FL (ref 80–94)
MONOCYTES # BLD AUTO: 0.46 X10(3)/MCL (ref 0.1–1.3)
MONOCYTES NFR BLD AUTO: 7.5 %
NEUTROPHILS # BLD AUTO: 4.01 X10(3)/MCL (ref 2.1–9.2)
NEUTROPHILS NFR BLD AUTO: 65.7 %
NRBC BLD AUTO-RTO: 0 %
PLATELET # BLD AUTO: 282 X10(3)/MCL (ref 130–400)
PMV BLD AUTO: 9.7 FL (ref 7.4–10.4)
POTASSIUM SERPL-SCNC: 4.6 MMOL/L (ref 3.5–5.1)
PROT SERPL-MCNC: 7.4 GM/DL (ref 5.8–7.6)
PTH-INTACT SERPL-MCNC: 29.1 PG/ML (ref 8.7–77)
RBC # BLD AUTO: 4.01 X10(6)/MCL (ref 4.2–5.4)
SODIUM SERPL-SCNC: 143 MMOL/L (ref 136–145)
WBC # BLD AUTO: 6.11 X10(3)/MCL (ref 4.5–11.5)

## 2024-11-12 PROCEDURE — 36415 COLL VENOUS BLD VENIPUNCTURE: CPT

## 2024-11-12 PROCEDURE — 83970 ASSAY OF PARATHORMONE: CPT

## 2024-11-12 PROCEDURE — 85025 COMPLETE CBC W/AUTO DIFF WBC: CPT

## 2024-11-12 PROCEDURE — 80053 COMPREHEN METABOLIC PANEL: CPT

## 2024-11-12 NOTE — TELEPHONE ENCOUNTER
I ATTEMPT TO CALL PT NO ANSWER LEFT A VOICEMAIL MESSAGE TO CALL CLINIC BACK  regarding lab result. Also will send results thru the portable                ----- Message from MARGARET Cox sent at 11/12/2024  2:24 PM CST -----  Please advise the patient that all lab are noted to be clinically acceptable at this time, Ca+ slightly elevated, I ran a reflex PTH and noted to be normal, we will repeat lab at her f/u apt next month, we will continue to monitor at future lab draws

## 2024-11-13 DIAGNOSIS — M25.40 SWELLING OF JOINT OF MULTIPLE SITES: Primary | ICD-10-CM

## 2024-12-10 ENCOUNTER — TELEPHONE (OUTPATIENT)
Dept: RHEUMATOLOGY | Facility: CLINIC | Age: 69
End: 2024-12-10
Payer: COMMERCIAL

## 2024-12-10 ENCOUNTER — OFFICE VISIT (OUTPATIENT)
Dept: RHEUMATOLOGY | Facility: CLINIC | Age: 69
End: 2024-12-10
Payer: COMMERCIAL

## 2024-12-10 VITALS
RESPIRATION RATE: 20 BRPM | TEMPERATURE: 98 F | OXYGEN SATURATION: 96 % | BODY MASS INDEX: 26.87 KG/M2 | DIASTOLIC BLOOD PRESSURE: 91 MMHG | WEIGHT: 167.19 LBS | HEIGHT: 66 IN | HEART RATE: 98 BPM | SYSTOLIC BLOOD PRESSURE: 139 MMHG

## 2024-12-10 DIAGNOSIS — M79.672 BILATERAL FOOT PAIN: ICD-10-CM

## 2024-12-10 DIAGNOSIS — M79.642 BILATERAL HAND PAIN: ICD-10-CM

## 2024-12-10 DIAGNOSIS — M25.511 ACUTE PAIN OF BOTH SHOULDERS: ICD-10-CM

## 2024-12-10 DIAGNOSIS — M25.512 CHRONIC PAIN OF BOTH SHOULDERS: ICD-10-CM

## 2024-12-10 DIAGNOSIS — M25.40 SWELLING OF MULTIPLE JOINTS: ICD-10-CM

## 2024-12-10 DIAGNOSIS — G89.29 CHRONIC PAIN OF BOTH SHOULDERS: ICD-10-CM

## 2024-12-10 DIAGNOSIS — M13.80 SERONEGATIVE INFLAMMATORY ARTHRITIS: Primary | ICD-10-CM

## 2024-12-10 DIAGNOSIS — M25.511 CHRONIC PAIN OF BOTH SHOULDERS: ICD-10-CM

## 2024-12-10 DIAGNOSIS — M12.30 PALINDROMIC RHEUMATISM: ICD-10-CM

## 2024-12-10 DIAGNOSIS — M25.512 ACUTE PAIN OF BOTH SHOULDERS: ICD-10-CM

## 2024-12-10 DIAGNOSIS — R22.42 LOCALIZED SWELLING, MASS, OR LUMP OF LEFT LOWER EXTREMITY: ICD-10-CM

## 2024-12-10 DIAGNOSIS — M79.671 BILATERAL FOOT PAIN: ICD-10-CM

## 2024-12-10 DIAGNOSIS — M79.641 BILATERAL HAND PAIN: ICD-10-CM

## 2024-12-10 DIAGNOSIS — R76.8 ELEVATED IGE LEVEL: ICD-10-CM

## 2024-12-10 PROCEDURE — 99215 OFFICE O/P EST HI 40 MIN: CPT | Mod: PBBFAC | Performed by: INTERNAL MEDICINE

## 2024-12-10 RX ORDER — ADALIMUMAB 40MG/0.4ML
40 KIT SUBCUTANEOUS
Qty: 2 PEN | Refills: 11 | Status: SHIPPED | OUTPATIENT
Start: 2024-12-10

## 2024-12-10 NOTE — TELEPHONE ENCOUNTER
Starting her on Humira, she needs education about injection administration. Thank you.     She has follow up with Malena on 1/23/24, will do labs at that time.

## 2024-12-10 NOTE — TELEPHONE ENCOUNTER
Humira Counseling: Patient plans to request an in-person counseling when she picks up her prescription.

## 2024-12-10 NOTE — PROGRESS NOTES
"  Patient ID: [id number]     Chief Complaint: Inflammatory arthritis (Pt state having pato arm pain &welling. Top rt foot swelling,lt foot swelling and lt hand painful)      HPI:     Leah Pepper is a 69 y.o. 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      Presents for follow up of pain in swelling in multiple joints. She was recent seen in [place] by Rheumatology June 2024.  She reports pain started around May of 2024- states after a night of dancing, she had some swelling to her left 2nd toe- didn't think much of it, left for a cruise the following day, went on the cruise and came back and saw [facility]- given antibiotics and prednisone, took as directed- she states within the hour she states every joint in her body was hurting, had swelling in her leg. States hand was swollen up, left shoulder pain was severe. Presented to [facility] due to pain and swelling and ended getting dx with Pneumonia- given meds but did not take as she did not feel that it was Pneumonia, the following day she went to [facility] and admitted x 5 days- had a "whole slew" of test completed and reports all negative (did have a low titer +ALIE but repeat negative). She she was given Cephalexin again to see if she would have the same reaction and states same thing occurred- swelling and severe joint pain all over again. She was enc to see a Rheumatologist, could not get in here in [place] so went to [place]- did a lot of test, states was told her immune system was intact but did have a MRI of her right hand- she states she was told she had a "tear" and was advised to see an Ortho- completed from [facility]. She reports she continues to have pain, mainly to her right hand- has swelling, cannot make a fist, left hand is ok, but pain with movement and swelling to her left wrist. She states she is a very active 69 year old- cleans her own house- was playing soccer until she was 58- so this has got her down. She states the " "pain is "funny", because it will "move", not always in the same place some days in one place then another will be all joints. Joints that have been bothersome have been right hand, left ankle, right elbow, right toes, right shoulder- left shoulder and elbow have been ok but states initially did have left shoulder pain, now moved to right side.  She reports with shoulder pain she is unable to raise her arm.  She denies any difficulty getting up from a seated position, no muscle weakness.  She does report areas have been red/warm and swollen when painful. She does have a history of right wrist fracture in 2021 but this wrist has not been painful, just the hand and fingers. She denies any morning stiffness when waking up. She has taken Prednisone and did help but also had to take Tylenol with it (only a 5 mg dosing), she has been taking Advil- will take 3-4 tab at a time bid to help with pain and "loosen" up and then again after dinner. Pain will improve with movement at times with the exception of her right hand, other joints pain does improve with movement. She does feel when she was on Prednisone pain was much better, when off pain returns.       12/10/24: Ms Pepper is her for follow up. She has no prior medical history complaining of joint pain started in May 2024.  She had episode of severe joint pain in multiple joints, in May 20, 2024, symptoms started acutely.  Denies history of bloody diarrhea, and symptoms of gastroenteritis, no history of urinary infections.  No fevers or rashes.  Currently complaining of swelling in left 2nd toe with redness pain and swelling in right hand and upper back.  Intermittently she gets pain and swelling in shoulders or knees or hips or ankles.  Was prescribed methotrexate on 10/4/2024, and patient filled prescription but did not take medication given concern about side effects.  She is currently taking sulfasalazine in 1500 mg b.i.d. without any issues.  She has not any improvement " in her symptoms with it.  She has to continue to take high doses of ibuprofen and Tylenol to help with joint pain.  Complaining of pain and swelling in left wrist, left foot and left ankle.    Rheumatology note from [facility]: Joint pain Starting early May while on a River cruise in [place] had Lt 2nd toe swelling, redness and pain, then progressed to b/l hand and wrist pain and swelling with functional limitations. Was in ER twice who treated her with ABX and Steroids.  Some improvement on prednisone taper, still with stiffness and swelling int he right hand and lt wrist. Very suggestive for RA. Schedule MRI of Rt hand and wrist in [place] if possible       Denies any recent illness or hospitalizations since last visit.     Denies history of fevers, rashes, photosensitivity, oral or nasal ulcers, h/o MI, stroke, seizures, h/o PE or DVT, Raynaud's phenomenon, uveitis, malignancies.   Family history of autoimmune disease: None  Pregnancies: 2  Miscarriages: None  Smoking:  Quit 1987, start age of 14 years old, hx <1/2 ppd- did quit off and on for several years during this time, states maybe smoked 5 years total.     Social History     Tobacco Use   Smoking Status Former    Current packs/day: 0.25    Average packs/day: 0.3 packs/day for 10.0 years (2.5 ttl pk-yrs)    Types: Cigarettes   Smokeless Tobacco Never        -------------------------------------    No known health problems        Past Surgical History:   Procedure Laterality Date    COSMETIC SURGERY         Review of patient's allergies indicates:   Allergen Reactions    Cephalexin Other (See Comments)     Swelling and joint pain    Diphenhydramine      Other reaction(s): heart races       Current Outpatient Medications   Medication Instructions    HUMIRA(CF) PEN 40 mg, Subcutaneous, Every 14 days         Social History     Socioeconomic History    Marital status:    Tobacco Use    Smoking status: Former     Current packs/day: 0.25     Average  packs/day: 0.3 packs/day for 10.0 years (2.5 ttl pk-yrs)     Types: Cigarettes    Smokeless tobacco: Never   Substance and Sexual Activity    Alcohol use: Yes     Alcohol/week: 3.0 standard drinks of alcohol     Types: 3 Glasses of wine per week     Comment: ocassional    Drug use: Never    Sexual activity: Yes     Partners: Male     Social Drivers of Health     Financial Resource Strain: Low Risk  (5/20/2024)    [facility] Subsidiaries and Affiliates    Overall Financial Resource Strain (CARDIA)     Difficulty of Paying Living Expenses: Not very hard   Food Insecurity: No Food Insecurity (5/20/2024)    [facility] Its Subsidiaries and Affiliates    Hunger Vital Sign     Worried About Running Out of Food in the Last Year: Never true     Ran Out of Food in the Last Year: Never true   Transportation Needs: No Transportation Needs (5/12/2024)    PRAPARE - Transportation     Lack of Transportation (Medical): No     Lack of Transportation (Non-Medical): No   Physical Activity: Unknown (5/12/2024)    Exercise Vital Sign     Days of Exercise per Week: 2 days   Stress: No Stress Concern Present (5/12/2024)    [employer] - Occupational Stress Questionnaire     Feeling of Stress : Not at all   Housing Stability: Unknown (5/12/2024)    Housing Stability Vital Sign     Unable to Pay for Housing in the Last Year: No        Family History   Problem Relation Name Age of Onset    No Known Problems Mother      Heart disease Father Alejandro     Diabetes Father Alejandro     No Known Problems Sister      No Known Problems Brother          Immunization History   Administered Date(s) Administered    COVID-19, MRNA, LN-S, PF (Pfizer) (Purple Cap) 02/04/2021, 02/25/2021, 09/29/2021, 05/12/2022    Pneumococcal Conjugate - 13 Valent 01/29/2021    Zoster Recombinant 01/29/2021, 07/29/2021  "      Patient Care Team:  Janeth Tian MD as PCP - General (Family Medicine)     Subjective:       Constitutional:  Denies chills. Denies fever. Denies night sweats. Denies weight loss. Denies sleep disturbance.   Ophthalmology: Denies blurred vision. Denies diminished visual acuity. Denies dry eyes. Denies eye pain. Denies Itching and redness.   ENT: Denies decreased hearing. Denies oral ulcers. Denies epistaxis. Denies swelling of ears or throat. Denies dry mouth. Denies swollen glands. Denies hair loss.   Endocrine: Denies diabetes. Denies thyroid Problems.   Respiratory: Denies cough. Denies shortness of breath. Denies shortness of breath with exertion. Denies hemoptysis.   Cardiovascular: Denies chest pain at rest. Denies chest pain with exertion. Denies Irregular heartbeat. Denies palpitations. Denies edema. Denies orthopnea.   Gastrointestinal: Denies abdominal pain. Denies diarrhea. Denies nausea. Denies vomiting. Denies hematemesis or hematochezia. Denies change in appetite. Denies heartburn.  Genitourinary: Denies dysuria. Denies urinary frequency. Denies urinary urgency. Denies blood in urine.  Musculoskeletal: See HPI for details  Integumentary: Denies rash. Denies Itching. Denies dry skin. Denies photosensitivity.   Peripheral Vascular: Denies Ulcers of hands and/or feet. Denies Cold extremities.   Neurologic: Denies dizziness. Denies headache. Denies difficulty speaking. Admits loss of strength to right hand. Denies numbness or tingling.   Psychiatric: Denies depression. Denies anxiety. Denies suicidal/homicidal ideations.    Objective:     Visit Vitals  BP (!) 139/91 (BP Location: Right arm, Patient Position: Sitting)   Pulse 98   Temp 98.2 °F (36.8 °C) (Oral)   Resp 20   Ht 5' 6" (1.676 m)   Wt 75.8 kg (167 lb 3.2 oz)   SpO2 96%   BMI 26.99 kg/m²         Physical Exam    General Appearance: alert, pleasant, in no acute distress.  Skin: Skin color, texture, turgor normal. No rashes or " lesions.  Eyes:  extraocular movement intact (EOMI), pupils equal, round, reactive to light and accommodation, conjunctiva clear.  ENT: No oral or nasal ulcers.  Neck:  Neck supple. No adenopathy.   Lungs: CTA bilaterally without crackles, rhonchi, or wheezes.   Heart: RRR w/o murmurs.  No edema. 2+ DP pulse.  Neuro: Alert, oriented, CN II-XII GI, sensory and motor innervation intact with exception of decreased flexion of right first and second digits.  Musculoskeletal: Left 2nd toe slightly erythematous and swollen- looks like sausage digit.  No tenderness in the toe with palpation.  Swelling of left foot and left ankle and tenderness with ROM.   Tenderness and swelling of right 2nd and 3rd MCPs improved.    Psych: Alert, oriented, normal eye contact.        Labs Reviewed:   2024 ALIE 1.3 (<1), RF/CCP negative, elevated CRP at 100  2024 immunoglobulins IgE elevated at 357 (0-100),  2024: ALIE negative, HLA B27 negative, C3/C4 okay, TSH okay, TPO negative, free T3 and T4 okay, uric acid 5.6, RF <5 (< 14), CCP less than 8 (<16.99), CRP 2.4 (<0.5), ESR 44 (<30), hep B/C/A all negative, urine with no blood or protein noted  2024 CMP Ca+ 10.4, otherwise okay, CRP 19.6 (<5)-previously noted at 103 months ago, uric acid 6.3, CBC okay, immunoglobulins okay, HIV negative, ESR 9 (<20) previously noted at 413 months ago, kappa free light chain 2.26 (0.3300-1.94), lambda free light chain okay, kappa lambda ratio okay.  SPEP no M spike indicate of of monoclonal protein is identified.  IFA: Immunofixation shows a normal pattern of immunoglobulins.  No monoclonal band detected.  Immunoglobulins IgG, IgA and IgM all okay  10/4/2024 CBC okay, Sed rate 8 (<20), CRP 12.10 (5), CMP Ca 10.3 otherwise okay. Repeat IgE normal.   10/23/24: Chikungunya ab IgG and IgM negative.   2024 CBC okay, CMP Ca+ 10.6, PTH 29.1       Imagin2024 CXRay: Impression: No acute disease or significant cardiopulmonary  abnormality.     6/19/2024 Bilateral Hand Xrays; IMPRESSION: Osteopenia and mild degenerative changes in bilateral hands and STT joints.   Bilateral Ankle Xray: IMPRESSION: Mild to moderate degenerative changes in bilateral feet.  Left lateral ankle joint soft tissue swelling and tiny lateral malleolar osteophyte also on the left. No significant degenerative changes in the right ankle.     08/02/2024 MRI right hand/fingers without contrast:  Longitudinal tear of the flexor digitorum superficialis and profundus of the 3rd digit beginning near the head was 3rd metacarpal distally to the midportion of the middle phalanx.     8/20/2024: Left and Right Shoulder x-ray: Impression:No acute abnormalities are seen  Left Foot Xray: Impression: Degenerative changes. Tiny anterior calcaneal spur.  Right Foot Xray:  Impression: Minimal degenerative changes.    Assessment:       ICD-10-CM ICD-9-CM   1. 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 Swelling of multiple joints  M25.40 719.09   4. Bilateral foot pain  M79.671 729.5    M79.672    5. Bilateral hand pain  M79.641 729.5    M79.642    6. Chronic pain of both shoulders  M25.511 719.41    G89.29 338.29    M25.512    7. Acute pain of both shoulders  M25.511 719.41    M25.512    8. Elevated IgE level  R76.8 795.79   9. Localized swelling, mass, or lump of left lower extremity  R22.42 782.2              Plan:     1. Inflammatory Arthritis/seronegative inflammatory arthritis  Possible reactive arthritis vs palindromic rheumatism following viral infection acquired during trip to [place] in May 2024. Chikungunya antibodies negative. Inflammatory markers have improved since May 2024.  Initially responded well to prednisone but stopped taking given concerns for prolonged steroid use. No improvement with sulfasalazine.   - methotrexate was prescribed but patient never started it because she was afraid of side effects of  methotrexate.  - stop sulfasalazine and start Humira 40 mg SQ every other week.  Discussed the risks and benefits of medication with the patient.  - follow-up in 6 weeks after starting Humira.    2. Swelling of left ankle and left foot, ordered MRI.    3. Elevated IgE  - Repeat IgE 106 normal (<144) in 10/2024, remaining Immunoglobulins ok 8/2024     4. Drug-induced immunodeficiency; Persons with rheumatoid arthritis, lupus, psoriatic arthritis and other autoimmune diseases are at increased risk of cardiovascular disease including heart attack and stroke. We recommend that all patients with these conditions have annual health maintenance exams including lipid measurements, blood pressure measurements, and smoking cessation counseling when applicable at their primary care provider's office.   -Advised to stay up-to-date on age appropriate vaccinations and malignancy screening, including yearly skin exams.      Discussed the use of anti-TNF agents in inflammatory autoimmune disease.  TNF-alpha promotes the inflammatory response, which in turn causes many of the clinical problems associated with autoimmune disorders such as rheumatoid arthritis, ankylosing spondylitis, Crohn's disease, psoriasis, hidradenitis suppurativa and refractory asthma.  Inhibition of TNF-alpha can decrease inflammation and in turn stop or decrease joint damage.    This inhibition of TNF-alpha can be achieved with a monoclonal antibody such as infliximab (Remicade), adalimumab (Humira), certolizumab pegol (Cimzia), and golimumab (Simponi), or with a circulating receptor fusion protein such as etanercept (Enbrel).  These agents can be used as mono-therapy or in combination with other immunosuppressants.  Side effects include infection, especially opportunistic infections such as tuberculosis, fungal infections and certain types of bacterial infections.       Follow up in about 6 weeks (around 1/21/2025). In addition to their scheduled follow up,  the patient has also been instructed to follow up on as needed basis.       Total time spent with patient and documentation is 40 minutes. All questions were answered to patient's satisfaction and patient verbalized understanding.

## 2024-12-11 ENCOUNTER — DOCUMENTATION ONLY (OUTPATIENT)
Dept: RHEUMATOLOGY | Facility: CLINIC | Age: 69
End: 2024-12-11
Payer: COMMERCIAL

## 2024-12-11 NOTE — PROGRESS NOTES
[facility] PEN INJ 40/0.4ML   Reference Number: [id number]   approved until 12/10/2025     Insurance requires the patient to fill medication at [facility].Forwarded the prescription to [facility] through [facility].   Spoke to patient and explained -gave her [facility] phone number-also gave her Humira copay card info. Advised her to call back with any questions.    [facility]  Phone: [phone]  Fax: [phone]    Humira coupon info:  Card: [id number]  Issued: 12/10/2024  Rx GROUP: [id number]  Rx BIN: [id number]  Rx PCN: [id number]

## 2024-12-18 ENCOUNTER — TELEPHONE (OUTPATIENT)
Dept: RHEUMATOLOGY | Facility: CLINIC | Age: 69
End: 2024-12-18
Payer: COMMERCIAL

## 2024-12-18 NOTE — TELEPHONE ENCOUNTER
----- Message from Padmini Wright MD sent at 12/12/2024  4:57 PM CST -----  Whey did you send these to me, this is the order for MRI of ankle and foot placed by me.  This needs to go to [facility]. Do you have any question about this?  ----- Message -----  From: Kristi Huynh LPN  Sent: 12/11/2024  10:15 AM CST  To: Padmini Wright MD; Perri Aguiar LPN    MRI Foot   Mri Ankle

## 2024-12-18 NOTE — TELEPHONE ENCOUNTER
Faxed requested information to [facility] for MRI Foot/toes w/wo --Left MRI ankle w/wo-Left. Faxed confirmation will be scanned to [employer] for reference.

## 2024-12-19 ENCOUNTER — TELEPHONE (OUTPATIENT)
Dept: RHEUMATOLOGY | Facility: CLINIC | Age: 69
End: 2024-12-19
Payer: COMMERCIAL

## 2024-12-19 NOTE — TELEPHONE ENCOUNTER
Approve/Deny. Set up RXs with dated 4-11-17 and 5-10-17. Last OV LE ADD 1-5-17 with request to follow up June. Last refill was 3-13-17 with 2 scripts, but they only picked up 1 script as both were dated with same date.    Pt leaving back to school, wants Please let her know I have reviewed the results of MRI, it showed inflammation in left 2nd toe and in ankle, Humira that we have started should help.

## 2024-12-20 NOTE — TELEPHONE ENCOUNTER
Patient states she would like to speak with provider about this medication. Did ask her about concerns she states she is not comfortable with starting this medcation without speaking with a provider to know what are the side effects from taking the medications and long term usage. Did inform her that we do have a clinical pharmacist patient politely declined and startes she wants to speak with a provider.She has not started Humira.  Did inform patient  is out for the rest of the week and will not be returning until 12/31/2024 Pt verbalized understanding and states she will wait.

## 2025-02-26 ENCOUNTER — OFFICE VISIT (OUTPATIENT)
Dept: RHEUMATOLOGY | Facility: CLINIC | Age: 70
End: 2025-02-26
Payer: COMMERCIAL

## 2025-02-26 ENCOUNTER — LAB VISIT (OUTPATIENT)
Dept: LAB | Facility: HOSPITAL | Age: 70
End: 2025-02-26
Attending: INTERNAL MEDICINE
Payer: COMMERCIAL

## 2025-02-26 VITALS
DIASTOLIC BLOOD PRESSURE: 76 MMHG | HEART RATE: 87 BPM | OXYGEN SATURATION: 98 % | SYSTOLIC BLOOD PRESSURE: 117 MMHG | RESPIRATION RATE: 18 BRPM | TEMPERATURE: 98 F | BODY MASS INDEX: 26.62 KG/M2 | HEIGHT: 66 IN | WEIGHT: 165.63 LBS

## 2025-02-26 DIAGNOSIS — M79.671 BILATERAL FOOT PAIN: ICD-10-CM

## 2025-02-26 DIAGNOSIS — D84.821 DRUG-INDUCED IMMUNODEFICIENCY: ICD-10-CM

## 2025-02-26 DIAGNOSIS — M13.80 SERONEGATIVE INFLAMMATORY ARTHRITIS: ICD-10-CM

## 2025-02-26 DIAGNOSIS — M79.672 BILATERAL FOOT PAIN: ICD-10-CM

## 2025-02-26 DIAGNOSIS — Z79.899 DRUG-INDUCED IMMUNODEFICIENCY: ICD-10-CM

## 2025-02-26 DIAGNOSIS — M12.30 PALINDROMIC RHEUMATISM: ICD-10-CM

## 2025-02-26 DIAGNOSIS — Z23 IMMUNIZATION DUE: Primary | ICD-10-CM

## 2025-02-26 DIAGNOSIS — M13.80 SERONEGATIVE INFLAMMATORY ARTHRITIS: Primary | ICD-10-CM

## 2025-02-26 LAB
ALBUMIN SERPL-MCNC: 4.1 G/DL (ref 3.4–4.8)
ALBUMIN/GLOB SERPL: 1.1 RATIO (ref 1.1–2)
ALP SERPL-CCNC: 73 UNIT/L (ref 40–150)
ALT SERPL-CCNC: 22 UNIT/L (ref 0–55)
ANION GAP SERPL CALC-SCNC: 6 MEQ/L
AST SERPL-CCNC: 20 UNIT/L (ref 5–34)
BASOPHILS # BLD AUTO: 0.02 X10(3)/MCL
BASOPHILS NFR BLD AUTO: 0.3 %
BILIRUB SERPL-MCNC: 0.4 MG/DL
BUN SERPL-MCNC: 20.8 MG/DL (ref 9.8–20.1)
CALCIUM SERPL-MCNC: 9.6 MG/DL (ref 8.4–10.2)
CHLORIDE SERPL-SCNC: 108 MMOL/L (ref 98–107)
CO2 SERPL-SCNC: 26 MMOL/L (ref 23–31)
CREAT SERPL-MCNC: 0.94 MG/DL (ref 0.55–1.02)
CREAT/UREA NIT SERPL: 22
EOSINOPHIL # BLD AUTO: 0.15 X10(3)/MCL (ref 0–0.9)
EOSINOPHIL NFR BLD AUTO: 2.3 %
ERYTHROCYTE [DISTWIDTH] IN BLOOD BY AUTOMATED COUNT: 12.2 % (ref 11.5–17)
GFR SERPLBLD CREATININE-BSD FMLA CKD-EPI: >60 ML/MIN/1.73/M2
GLOBULIN SER-MCNC: 3.7 GM/DL (ref 2.4–3.5)
GLUCOSE SERPL-MCNC: 94 MG/DL (ref 82–115)
HCT VFR BLD AUTO: 40.4 % (ref 37–47)
HGB BLD-MCNC: 13.6 G/DL (ref 12–16)
IMM GRANULOCYTES # BLD AUTO: 0.01 X10(3)/MCL (ref 0–0.04)
IMM GRANULOCYTES NFR BLD AUTO: 0.2 %
LYMPHOCYTES # BLD AUTO: 2.54 X10(3)/MCL (ref 0.6–4.6)
LYMPHOCYTES NFR BLD AUTO: 38.2 %
MCH RBC QN AUTO: 31.6 PG (ref 27–31)
MCHC RBC AUTO-ENTMCNC: 33.7 G/DL (ref 33–36)
MCV RBC AUTO: 93.7 FL (ref 80–94)
MONOCYTES # BLD AUTO: 0.43 X10(3)/MCL (ref 0.1–1.3)
MONOCYTES NFR BLD AUTO: 6.5 %
NEUTROPHILS # BLD AUTO: 3.5 X10(3)/MCL (ref 2.1–9.2)
NEUTROPHILS NFR BLD AUTO: 52.5 %
NRBC BLD AUTO-RTO: 0 %
PLATELET # BLD AUTO: 283 X10(3)/MCL (ref 130–400)
PMV BLD AUTO: 9.6 FL (ref 7.4–10.4)
POTASSIUM SERPL-SCNC: 4.3 MMOL/L (ref 3.5–5.1)
PROT SERPL-MCNC: 7.8 GM/DL (ref 5.8–7.6)
RBC # BLD AUTO: 4.31 X10(6)/MCL (ref 4.2–5.4)
SODIUM SERPL-SCNC: 140 MMOL/L (ref 136–145)
WBC # BLD AUTO: 6.65 X10(3)/MCL (ref 4.5–11.5)

## 2025-02-26 PROCEDURE — 80053 COMPREHEN METABOLIC PANEL: CPT

## 2025-02-26 PROCEDURE — 99213 OFFICE O/P EST LOW 20 MIN: CPT | Mod: PBBFAC | Performed by: INTERNAL MEDICINE

## 2025-02-26 PROCEDURE — G0009 ADMIN PNEUMOCOCCAL VACCINE: HCPCS | Mod: PBBFAC

## 2025-02-26 PROCEDURE — 36415 COLL VENOUS BLD VENIPUNCTURE: CPT

## 2025-02-26 PROCEDURE — 85025 COMPLETE CBC W/AUTO DIFF WBC: CPT

## 2025-02-26 PROCEDURE — 90677 PCV20 VACCINE IM: CPT | Mod: PBBFAC

## 2025-02-26 RX ADMIN — PNEUMOCOCCAL 20-VALENT CONJUGATE VACCINE 0.5 ML
2.2; 2.2; 2.2; 2.2; 2.2; 2.2; 2.2; 2.2; 2.2; 2.2; 2.2; 2.2; 2.2; 2.2; 2.2; 2.2; 4.4; 2.2; 2.2; 2.2 INJECTION, SUSPENSION INTRAMUSCULAR at 12:02

## 2025-02-26 NOTE — PROGRESS NOTES
Patient ID: [id number]     Chief Complaint: No chief complaint on file.      HPI:     Leah Pepper is a 70 y.o. female here today for a follow up visit for seronegative arthritis.        Street:  She has no prior medical history complaining of joint pain started in May 2024.  She had multiple episodes of severe joint pain in multiple joints, in May, 2024, symptoms started acutely.  Denies history of bloody diarrhea, and symptoms of gastroenteritis, no history of urinary infections.  No fevers or rashes.  Currently complaining of swelling in left 2nd toe with redness pain and swelling in right hand and upper back.  Intermittently she gets pain and swelling in shoulders or knees or hips or ankles.Was prescribed methotrexate on 10/4/2024, and patient filled prescription but did not take medication given concern about side effects.  Failed sulfasalazine.    For follow-up and doing well overall.  Started Humira after last visit.  Taking Humira 40 mg subQ every other week.  Tolerating it well without any issues.  Joint pain and swelling improved after starting Humira.  Denies any joint pain today, swelling in feet and ankles resolved.    She is back to her baseline health.    Rheumatology note from [facility]: Joint pain Starting early May while on a River cruise in [place] had Lt 2nd toe swelling, redness and pain, then progressed to b/l hand and wrist pain and swelling with functional limitations. Was in ER twice who treated her with ABX and Steroids.  Some improvement on prednisone taper, still with stiffness and swelling int he right hand and lt wrist. Very suggestive for RA. Schedule MRI of Rt hand and wrist in [place] if possible       Denies any recent illness or hospitalizations since last visit.     Denies history of fevers, rashes, photosensitivity, oral or nasal ulcers, h/o MI, stroke, seizures, h/o PE or DVT, Raynaud's phenomenon, uveitis, malignancies.   Family history of autoimmune disease: None  Pregnancies: 2   Miscarriages: None  Smoking:  Quit 1987, start age of 14 years old, hx <1/2 ppd- did quit off and on for several years during this time, states maybe smoked 5 years total.     Social History     Tobacco Use   Smoking Status Former    Current packs/day: 0.25    Average packs/day: 0.3 packs/day for 10.0 years (2.5 ttl pk-yrs)    Types: Cigarettes   Smokeless Tobacco Never        -------------------------------------    No known health problems        Past Surgical History:   Procedure Laterality Date    COSMETIC SURGERY         Review of patient's allergies indicates:   Allergen Reactions    Cephalexin Other (See Comments)     Swelling and joint pain    Diphenhydramine      Other reaction(s): heart races       Current Outpatient Medications   Medication Instructions    HUMIRA(CF) PEN 40 mg, Subcutaneous, Every 14 days         Social History     Socioeconomic History    Marital status:    Tobacco Use    Smoking status: Former     Current packs/day: 0.25     Average packs/day: 0.3 packs/day for 10.0 years (2.5 ttl pk-yrs)     Types: Cigarettes    Smokeless tobacco: Never   Substance and Sexual Activity    Alcohol use: Yes     Alcohol/week: 3.0 standard drinks of alcohol     Types: 3 Glasses of wine per week     Comment: ocassional    Drug use: Never    Sexual activity: Yes     Partners: Male     Social Drivers of Health     Financial Resource Strain: Low Risk  (5/20/2024)    Received from [facility]    Overall Financial Resource Strain (CARDIA)     Difficulty of Paying Living Expenses: Not very hard   Food Insecurity: No Food Insecurity (5/20/2024)    Received from [facility]    Hunger Vital Sign     Worried About Running Out of Food in the Last Year: Never true     Ran Out of Food in the Last Year: Never true   Transportation Needs: No Transportation Needs  (5/12/2024)    PRAPARE - Transportation     Lack of Transportation (Medical): No     Lack of Transportation (Non-Medical): No   Physical Activity: Unknown (5/12/2024)    Exercise Vital Sign     Days of Exercise per Week: 2 days   Stress: No Stress Concern Present (5/12/2024)    [place] [employer] - Occupational Stress Questionnaire     Feeling of Stress : Not at all   Housing Stability: Unknown (5/12/2024)    Housing Stability Vital Sign     Unable to Pay for Housing in the Last Year: No        Family History   Problem Relation Name Age of Onset    No Known Problems Mother      Heart disease Father Alejandro     Diabetes Father Alejandro     No Known Problems Sister      No Known Problems Brother          Immunization History   Administered Date(s) Administered    COVID-19, MRNA, LN-S, PF (Pfizer) (Purple Cap) 02/04/2021, 02/25/2021, 09/29/2021, 05/12/2022    COVID-19, mRNA, LNP-S, PF, juan carlos-sucrose, 30 mcg/0.3 mL (Pfizer Ages 12+) 10/05/2023    Influenza (FLUAD) - Quadrivalent - Adjuvanted - PF *Preferred* (65+) 10/05/2023    Pneumococcal Conjugate - 13 Valent 01/29/2021    Pneumococcal Conjugate - 20 Valent 02/26/2025    Zoster Recombinant 01/29/2021, 07/29/2021       Patient Care Team:  Janeth Tian MD as PCP - General (Family Medicine)     Subjective:       Constitutional:  Denies chills. Denies fever. Denies night sweats. Denies weight loss. Denies sleep disturbance.   Ophthalmology: Denies blurred vision. Denies diminished visual acuity. Denies dry eyes. Denies eye pain. Denies Itching and redness.   ENT: Denies decreased hearing. Denies oral ulcers. Denies epistaxis. Denies swelling of ears or throat. Denies dry mouth. Denies swollen glands. Denies hair loss.   Endocrine: Denies diabetes. Denies thyroid Problems.   Respiratory: Denies cough. Denies shortness of breath. Denies shortness of breath with exertion. Denies hemoptysis.   Cardiovascular: Denies chest pain at rest. Denies chest  "pain with exertion. Denies Irregular heartbeat. Denies palpitations. Denies edema. Denies orthopnea.   Gastrointestinal: Denies abdominal pain. Denies diarrhea. Denies nausea. Denies vomiting. Denies hematemesis or hematochezia. Denies change in appetite. Denies heartburn.  Genitourinary: Denies dysuria. Denies urinary frequency. Denies urinary urgency. Denies blood in urine.  Musculoskeletal: See HPI for details  Integumentary: Denies rash. Denies Itching. Denies dry skin. Denies photosensitivity.   Peripheral Vascular: Denies Ulcers of hands and/or feet. Denies Cold extremities.   Neurologic: Denies dizziness. Denies headache. Denies difficulty speaking. Admits loss of strength to right hand. Denies numbness or tingling.   Psychiatric: Denies depression. Denies anxiety. Denies suicidal/homicidal ideations.    Objective:     Visit Vitals  /76 (BP Location: Left arm, Patient Position: Sitting)   Pulse 87   Temp 97.8 °F (36.6 °C) (Oral)   Resp 18   Ht 5' 6" (1.676 m)   Wt 75.1 kg (165 lb 9.6 oz)   SpO2 98%   BMI 26.73 kg/m²           Physical Exam    General Appearance: alert, pleasant, in no acute distress.  Skin: Skin color, texture, turgor normal. No rashes or lesions.  Eyes:  extraocular movement intact (EOMI), pupils equal, round, reactive to light and accommodation, conjunctiva clear.  ENT: No oral or nasal ulcers.  Neck:  Neck supple. No adenopathy.   Lungs: CTA bilaterally without crackles, rhonchi, or wheezes.   Heart: RRR w/o murmurs.  No edema. 2+ DP pulse.  Neuro: Alert, oriented, CN II-XII GI, sensory and motor innervation intact with exception of decreased flexion of right first and second digits.  Musculoskeletal: h/o Left 2nd toe slightly erythematous and swollen- looks like sausage digit.  Swelling of left foot and left ankle and tenderness with ROM. Tenderness and swelling of right 2nd and 3rd MCPs improved.    Today:  No swelling in toes or ankles.  Tenderness in MCPs have resolved.  No " tenderness in left ankle.  No synovitis on exam.  Psych: Alert, oriented, normal eye contact.        Labs Reviewed:       2024: ESR 41. . Uric acid normal. ALIE positive- BAUDILIO. RF and CCP negative. No protein and blood in urine.  Lyme antibodies negative.  Anti CCP negative.  ALIE negative.  Anti Nancy 1 negative.  ESR elevated 82.  HLA B27 negative.  CRP elevated 216.  immunoglobulins IgE elevated at 357 (0-100),  2024:  Negative ALIE.  HLA B27 negative.  Rheumatoid factor and anti CCP negative.  CRP elevated 2.4.  C3, C4 okay.  Hepatitis-C, hepatitis-B and hepatitis-C negative.  2024: CRP 19.6. Immunoglobulins are normal. SPEP and GALLO normal. Quantiferon tb negative. HIV negative.   10/4/2024 CBC okay, Sed rate 8 (<20), CRP 12.10 (5), CMP Ca 10.3 otherwise okay. Repeat IgE normal.   10/23/24: Chikungunya ab IgG and IgM negative.   2024 CBC okay, CMP Ca+ 10.6, PTH 29.1.    Imagin2024 CXRay: Impression: No acute disease or significant cardiopulmonary abnormality.     2024 Bilateral Hand Xrays; IMPRESSION: Osteopenia and mild degenerative changes in bilateral hands and STT joints.   Bilateral Ankle Xray: IMPRESSION: Mild to moderate degenerative changes in bilateral feet.  Left lateral ankle joint soft tissue swelling and tiny lateral malleolar osteophyte also on the left. No significant degenerative changes in the right ankle.     2024 MRI right hand/fingers without contrast:  Longitudinal tear of the flexor digitorum superficialis and profundus of the 3rd digit beginning near the head was 3rd metacarpal distally to the midportion of the middle phalanx.     2024: Left and Right Shoulder x-ray: Impression:No acute abnormalities are seen  Left Foot Xray: Impression: Degenerative changes. Tiny anterior calcaneal spur.  Right Foot Xray:  Impression: Minimal degenerative changes.    MRI left ankle 2024:  Severe bone marrow and soft tissue edema centered around 2nd PIP and  DIPs joints of left foot.  Bone marrow and soft tissue edema around medial and lateral ankle ligaments reflecting enthesopathy.  Suggestive of inflammatory arthropathy such as psoriatic arthritis/seronegative arthropathy.    Assessment:       ICD-10-CM ICD-9-CM   1. 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 Bilateral foot pain  M79.671 729.5    M79.672    4. Drug-induced immunodeficiency  D84.821 279.3    Z79.899 E947.9                Plan:     1. Inflammatory Arthritis/seronegative inflammatory arthritis  Possible reactive arthritis vs palindromic rheumatism following viral infection acquired during trip to [place] in May 2024. Chikungunya antibodies negative. Inflammatory markers have improved since May 2024.  MRI of left ankle showed evere bone marrow and soft tissue edema centered around 2nd PIP and DIPs joints of left foot.  Bone marrow and soft tissue edema around medial and lateral ankle ligaments reflecting enthesopathy.  Suggestive of inflammatory arthropathy such as psoriatic arthritis/seronegative arthropathy.  - responded well to prednisone.  Failed sulfasalazine.  Not willing to try methotrexate.  - started Humira in December, 2024, doing well on treatment.  Continue with Humira 40 mg subQ every other week.  No synovitis on exam.  Labs today.    2. Swelling of left ankle and left foot:  Swelling resolved    3. Elevated IgE  - Repeat IgE 106 normal (<144) in 10/2024, remaining Immunoglobulins ok 8/2024     4. Drug-induced immunodeficiency; Persons with rheumatoid arthritis, lupus, psoriatic arthritis and other autoimmune diseases are at increased risk of cardiovascular disease including heart attack and stroke. We recommend that all patients with these conditions have annual health maintenance exams including lipid measurements, blood pressure measurements, and smoking cessation counseling when applicable at their primary care provider's office.    -Advised to stay up-to-date on age appropriate vaccinations and malignancy screening, including yearly skin exams.        Follow up in about 8 months (around 11/3/2025). In addition to their scheduled follow up, the patient has also been instructed to follow up on as needed basis.       Total time spent with patient and documentation is 30 minutes. All questions were answered to patient's satisfaction and patient verbalized understanding.